# Patient Record
Sex: FEMALE | Race: WHITE | Employment: OTHER | URBAN - METROPOLITAN AREA
[De-identification: names, ages, dates, MRNs, and addresses within clinical notes are randomized per-mention and may not be internally consistent; named-entity substitution may affect disease eponyms.]

---

## 2021-01-15 ENCOUNTER — HOSPITAL ENCOUNTER (INPATIENT)
Age: 86
LOS: 5 days | Discharge: NURSING, REHAB OR LONG-TERM CARE | DRG: 552 | End: 2021-01-21
Attending: EMERGENCY MEDICINE | Admitting: FAMILY MEDICINE
Payer: MEDICARE

## 2021-01-15 ENCOUNTER — APPOINTMENT (OUTPATIENT)
Dept: CT IMAGING | Age: 86
DRG: 552 | End: 2021-01-15
Attending: EMERGENCY MEDICINE
Payer: MEDICARE

## 2021-01-15 ENCOUNTER — APPOINTMENT (OUTPATIENT)
Dept: GENERAL RADIOLOGY | Age: 86
DRG: 552 | End: 2021-01-15
Attending: EMERGENCY MEDICINE
Payer: MEDICARE

## 2021-01-15 DIAGNOSIS — S12.100A CLOSED ODONTOID FRACTURE, INITIAL ENCOUNTER (HCC): Primary | ICD-10-CM

## 2021-01-15 DIAGNOSIS — S01.511A LIP LACERATION, INITIAL ENCOUNTER: ICD-10-CM

## 2021-01-15 PROCEDURE — 73502 X-RAY EXAM HIP UNI 2-3 VIEWS: CPT

## 2021-01-15 PROCEDURE — 72125 CT NECK SPINE W/O DYE: CPT

## 2021-01-15 PROCEDURE — 74011000250 HC RX REV CODE- 250: Performed by: EMERGENCY MEDICINE

## 2021-01-15 PROCEDURE — 70450 CT HEAD/BRAIN W/O DYE: CPT

## 2021-01-15 PROCEDURE — 96372 THER/PROPH/DIAG INJ SC/IM: CPT

## 2021-01-15 PROCEDURE — 99284 EMERGENCY DEPT VISIT MOD MDM: CPT

## 2021-01-15 RX ORDER — BUPIVACAINE HYDROCHLORIDE 5 MG/ML
3 INJECTION, SOLUTION EPIDURAL; INTRACAUDAL ONCE
Status: COMPLETED | OUTPATIENT
Start: 2021-01-15 | End: 2021-01-15

## 2021-01-15 RX ORDER — LANOLIN ALCOHOL/MO/W.PET/CERES
1000 CREAM (GRAM) TOPICAL DAILY
COMMUNITY

## 2021-01-15 RX ORDER — ALLOPURINOL 100 MG/1
100 TABLET ORAL DAILY
COMMUNITY

## 2021-01-15 RX ORDER — ESCITALOPRAM OXALATE 10 MG/1
10 TABLET ORAL DAILY
COMMUNITY

## 2021-01-15 RX ORDER — ACETAMINOPHEN 500 MG
2000 TABLET ORAL DAILY
COMMUNITY

## 2021-01-15 RX ORDER — ATORVASTATIN CALCIUM 20 MG/1
20 TABLET, FILM COATED ORAL DAILY
COMMUNITY

## 2021-01-15 RX ORDER — BUPIVACAINE HYDROCHLORIDE 5 MG/ML
INJECTION, SOLUTION EPIDURAL; INTRACAUDAL
Status: DISPENSED
Start: 2021-01-15 | End: 2021-01-16

## 2021-01-15 RX ORDER — LISINOPRIL 20 MG/1
20 TABLET ORAL DAILY
COMMUNITY

## 2021-01-15 RX ORDER — BUPROPION HYDROCHLORIDE 150 MG/1
150 TABLET ORAL
COMMUNITY

## 2021-01-15 RX ORDER — ASPIRIN 81 MG/1
81 TABLET ORAL DAILY
COMMUNITY

## 2021-01-15 RX ADMIN — BUPIVACAINE HYDROCHLORIDE 15 MG: 5 INJECTION, SOLUTION EPIDURAL; INTRACAUDAL; PERINEURAL at 22:32

## 2021-01-15 NOTE — Clinical Note
Status[de-identified] INPATIENT [101]   Type of Bed: Intensive Care [6]   Inpatient Hospitalization Certified Necessary for the Following Reasons: 9.  Other (further clarification in H&P documentation)   Admitting Diagnosis: Odontoid fracture with type II morphology and anterior displacement [facility]) [[id number]]   Admitting Diagnosis: Falls [[id number]]   Admitting Physician: [address] [place] [address], [address] [facility]   Attending Physician: Jerrod Villeda   Estimated Length of Stay: 2 Midnights   Discharge Plan[de-identified] Home with Office Follow-up

## 2021-01-16 ENCOUNTER — APPOINTMENT (OUTPATIENT)
Dept: MRI IMAGING | Age: 86
DRG: 552 | End: 2021-01-16
Attending: PHYSICIAN ASSISTANT
Payer: MEDICARE

## 2021-01-16 PROBLEM — S12.110A CLOSED ANTERIOR DISPLACED TYPE II DENS FRACTURE (HCC): Status: ACTIVE | Noted: 2021-01-16

## 2021-01-16 PROBLEM — F41.9 ANXIETY AND DEPRESSION: Status: ACTIVE | Noted: 2021-01-16

## 2021-01-16 PROBLEM — F32.A ANXIETY AND DEPRESSION: Status: ACTIVE | Noted: 2021-01-16

## 2021-01-16 PROBLEM — E78.5 HYPERLIPIDEMIA: Status: ACTIVE | Noted: 2021-01-16
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
## 2021-01-16 PROBLEM — S12.110A: Status: ACTIVE | Noted: 2021-01-16

## 2021-01-16 PROBLEM — I10 HTN (HYPERTENSION): Status: ACTIVE | Noted: 2021-01-16

## 2021-01-16 PROBLEM — F03.90 DEMENTIA (HCC): Status: ACTIVE | Noted: 2021-01-16

## 2021-01-16 LAB
ALBUMIN SERPL-MCNC: 3.5 G/DL (ref 3.5–5)
ALBUMIN/GLOB SERPL: 1.1 {RATIO} (ref 1.1–2.2)
ALP SERPL-CCNC: 76 U/L (ref 45–117)
ALT SERPL-CCNC: 38 U/L (ref 12–78)
ANION GAP SERPL CALC-SCNC: 5 MMOL/L (ref 5–15)
APPEARANCE UR: CLEAR
APTT PPP: 24.5 SEC (ref 22.1–31)
AST SERPL-CCNC: 22 U/L (ref 15–37)
BACTERIA URNS QL MICRO: NEGATIVE /HPF
BASOPHILS # BLD: 0 K/UL (ref 0–0.1)
BASOPHILS NFR BLD: 0 % (ref 0–1)
BILIRUB SERPL-MCNC: 0.5 MG/DL (ref 0.2–1)
BILIRUB UR QL: NEGATIVE
BUN SERPL-MCNC: 22 MG/DL (ref 6–20)
BUN/CREAT SERPL: 28 (ref 12–20)
CALCIUM SERPL-MCNC: 8.9 MG/DL (ref 8.5–10.1)
CHLORIDE SERPL-SCNC: 101 MMOL/L (ref 97–108)
CO2 SERPL-SCNC: 30 MMOL/L (ref 21–32)
COLOR UR: NORMAL
CREAT SERPL-MCNC: 0.8 MG/DL (ref 0.55–1.02)
DIFFERENTIAL METHOD BLD: ABNORMAL
EOSINOPHIL # BLD: 0.4 K/UL (ref 0–0.4)
EOSINOPHIL NFR BLD: 3 % (ref 0–7)
EPITH CASTS URNS QL MICRO: NORMAL /LPF
ERYTHROCYTE [DISTWIDTH] IN BLOOD BY AUTOMATED COUNT: 14.9 % (ref 11.5–14.5)
GLOBULIN SER CALC-MCNC: 3.1 G/DL (ref 2–4)
GLUCOSE SERPL-MCNC: 109 MG/DL (ref 65–100)
GLUCOSE UR STRIP.AUTO-MCNC: NEGATIVE MG/DL
HCT VFR BLD AUTO: 44.4 % (ref 35–47)
HGB BLD-MCNC: 13.6 G/DL (ref 11.5–16)
HGB UR QL STRIP: NEGATIVE
IMM GRANULOCYTES # BLD AUTO: 0.1 K/UL (ref 0–0.04)
IMM GRANULOCYTES NFR BLD AUTO: 1 % (ref 0–0.5)
INR PPP: 1.2 (ref 0.9–1.1)
KETONES UR QL STRIP.AUTO: NEGATIVE MG/DL
LEUKOCYTE ESTERASE UR QL STRIP.AUTO: NEGATIVE
LYMPHOCYTES # BLD: 1.8 K/UL (ref 0.8–3.5)
LYMPHOCYTES NFR BLD: 15 % (ref 12–49)
MCH RBC QN AUTO: 30.5 PG (ref 26–34)
MCHC RBC AUTO-ENTMCNC: 30.6 G/DL (ref 30–36.5)
MCV RBC AUTO: 99.6 FL (ref 80–99)
MONOCYTES # BLD: 1.3 K/UL (ref 0–1)
MONOCYTES NFR BLD: 11 % (ref 5–13)
NEUTS SEG # BLD: 8.3 K/UL (ref 1.8–8)
NEUTS SEG NFR BLD: 70 % (ref 32–75)
NITRITE UR QL STRIP.AUTO: NEGATIVE
NRBC # BLD: 0 K/UL (ref 0–0.01)
NRBC BLD-RTO: 0 PER 100 WBC
PH UR STRIP: 6 [PH] (ref 5–8)
PLATELET # BLD AUTO: 198 K/UL (ref 150–400)
PMV BLD AUTO: 11.1 FL (ref 8.9–12.9)
POTASSIUM SERPL-SCNC: 4.4 MMOL/L (ref 3.5–5.1)
PROT SERPL-MCNC: 6.6 G/DL (ref 6.4–8.2)
PROT UR STRIP-MCNC: NEGATIVE MG/DL
PROTHROMBIN TIME: 12.2 SEC (ref 9–11.1)
RBC # BLD AUTO: 4.46 M/UL (ref 3.8–5.2)
RBC #/AREA URNS HPF: NORMAL /HPF (ref 0–5)
SODIUM SERPL-SCNC: 136 MMOL/L (ref 136–145)
SP GR UR REFRACTOMETRY: 1.02 (ref 1–1.03)
THERAPEUTIC RANGE,PTTT: NORMAL SECS (ref 58–77)
UROBILINOGEN UR QL STRIP.AUTO: 0.2 EU/DL (ref 0.2–1)
WBC # BLD AUTO: 11.8 K/UL (ref 3.6–11)
WBC URNS QL MICRO: NORMAL /HPF (ref 0–4)

## 2021-01-16 PROCEDURE — 81001 URINALYSIS AUTO W/SCOPE: CPT

## 2021-01-16 PROCEDURE — 74011250637 HC RX REV CODE- 250/637: Performed by: FAMILY MEDICINE

## 2021-01-16 PROCEDURE — 36415 COLL VENOUS BLD VENIPUNCTURE: CPT

## 2021-01-16 PROCEDURE — 80053 COMPREHEN METABOLIC PANEL: CPT

## 2021-01-16 PROCEDURE — 85610 PROTHROMBIN TIME: CPT

## 2021-01-16 PROCEDURE — 85025 COMPLETE CBC W/AUTO DIFF WBC: CPT

## 2021-01-16 PROCEDURE — 85730 THROMBOPLASTIN TIME PARTIAL: CPT

## 2021-01-16 PROCEDURE — 65270000029 HC RM PRIVATE

## 2021-01-16 PROCEDURE — 72141 MRI NECK SPINE W/O DYE: CPT

## 2021-01-16 RX ORDER — SODIUM CHLORIDE 0.9 % (FLUSH) 0.9 %
5-40 SYRINGE (ML) INJECTION EVERY 8 HOURS
Status: DISCONTINUED | OUTPATIENT
Start: 2021-01-16 | End: 2021-01-21 | Stop reason: HOSPADM

## 2021-01-16 RX ORDER — LANOLIN ALCOHOL/MO/W.PET/CERES
0.4 CREAM (GRAM) TOPICAL DAILY
Status: DISCONTINUED | OUTPATIENT
Start: 2021-01-16 | End: 2021-01-21 | Stop reason: HOSPADM

## 2021-01-16 RX ORDER — POLYETHYLENE GLYCOL 3350 17 G/17G
17 POWDER, FOR SOLUTION ORAL DAILY PRN
Status: DISCONTINUED | OUTPATIENT
Start: 2021-01-16 | End: 2021-01-20 | Stop reason: SDUPTHER

## 2021-01-16 RX ORDER — BUPROPION HYDROCHLORIDE 150 MG/1
150 TABLET ORAL
Status: DISCONTINUED | OUTPATIENT
Start: 2021-01-16 | End: 2021-01-21 | Stop reason: HOSPADM

## 2021-01-16 RX ORDER — ALLOPURINOL 100 MG/1
100 TABLET ORAL DAILY
Status: DISCONTINUED | OUTPATIENT
Start: 2021-01-16 | End: 2021-01-21 | Stop reason: HOSPADM

## 2021-01-16 RX ORDER — LISINOPRIL 20 MG/1
20 TABLET ORAL DAILY
Status: DISCONTINUED | OUTPATIENT
Start: 2021-01-16 | End: 2021-01-21 | Stop reason: HOSPADM

## 2021-01-16 RX ORDER — ACETAMINOPHEN 325 MG/1
650 TABLET ORAL
Status: DISCONTINUED | OUTPATIENT
Start: 2021-01-16 | End: 2021-01-20 | Stop reason: SDUPTHER

## 2021-01-16 RX ORDER — MELATONIN
1000 DAILY
Status: DISCONTINUED | OUTPATIENT
Start: 2021-01-16 | End: 2021-01-21 | Stop reason: HOSPADM

## 2021-01-16 RX ORDER — ACETAMINOPHEN 650 MG/1
650 SUPPOSITORY RECTAL
Status: DISCONTINUED | OUTPATIENT
Start: 2021-01-16 | End: 2021-01-21 | Stop reason: HOSPADM

## 2021-01-16 RX ORDER — CHOLECALCIFEROL TAB 125 MCG (5000 UNIT) 125 MCG
5000 TAB ORAL DAILY
Status: DISCONTINUED | OUTPATIENT
Start: 2021-01-16 | End: 2021-01-21 | Stop reason: HOSPADM

## 2021-01-16 RX ORDER — ACETAMINOPHEN 325 MG/1
650 TABLET ORAL
Status: DISCONTINUED | OUTPATIENT
Start: 2021-01-16 | End: 2021-01-21 | Stop reason: HOSPADM

## 2021-01-16 RX ORDER — POLYETHYLENE GLYCOL 3350 17 G/17G
17 POWDER, FOR SOLUTION ORAL DAILY PRN
Status: DISCONTINUED | OUTPATIENT
Start: 2021-01-16 | End: 2021-01-21 | Stop reason: HOSPADM

## 2021-01-16 RX ORDER — SODIUM CHLORIDE 0.9 % (FLUSH) 0.9 %
5-40 SYRINGE (ML) INJECTION AS NEEDED
Status: DISCONTINUED | OUTPATIENT
Start: 2021-01-16 | End: 2021-01-21 | Stop reason: HOSPADM

## 2021-01-16 RX ORDER — ACETAMINOPHEN 650 MG/1
650 SUPPOSITORY RECTAL
Status: DISCONTINUED | OUTPATIENT
Start: 2021-01-16 | End: 2021-01-20 | Stop reason: SDUPTHER

## 2021-01-16 RX ORDER — ATORVASTATIN CALCIUM 20 MG/1
20 TABLET, FILM COATED ORAL DAILY
Status: DISCONTINUED | OUTPATIENT
Start: 2021-01-16 | End: 2021-01-21 | Stop reason: HOSPADM

## 2021-01-16 RX ORDER — ESCITALOPRAM OXALATE 10 MG/1
10 TABLET ORAL DAILY
Status: DISCONTINUED | OUTPATIENT
Start: 2021-01-16 | End: 2021-01-21 | Stop reason: HOSPADM

## 2021-01-16 RX ADMIN — Medication 10 ML: at 09:15

## 2021-01-16 RX ADMIN — ATORVASTATIN CALCIUM 20 MG: 20 TABLET, FILM COATED ORAL at 10:20

## 2021-01-16 RX ADMIN — ALLOPURINOL 100 MG: 100 TABLET ORAL at 10:20

## 2021-01-16 RX ADMIN — Medication 10 ML: at 14:00

## 2021-01-16 RX ADMIN — CHOLECALCIFEROL TAB 125 MCG (5000 UNIT) 5000 UNITS: 125 TAB at 10:20

## 2021-01-16 RX ADMIN — Medication 10 ML: at 21:15

## 2021-01-16 RX ADMIN — Medication 1 CAPSULE: at 10:20

## 2021-01-16 RX ADMIN — ACETAMINOPHEN 650 MG: 325 TABLET ORAL at 09:15

## 2021-01-16 RX ADMIN — LISINOPRIL 20 MG: 20 TABLET ORAL at 10:00

## 2021-01-16 RX ADMIN — BUPROPION HYDROCHLORIDE 150 MG: 150 TABLET, EXTENDED RELEASE ORAL at 10:20

## 2021-01-16 RX ADMIN — ACETAMINOPHEN 650 MG: 325 TABLET ORAL at 21:28

## 2021-01-16 RX ADMIN — Medication 0.4 MG: at 10:20

## 2021-01-16 RX ADMIN — ESCITALOPRAM OXALATE 10 MG: 10 TABLET ORAL at 10:20

## 2021-01-16 RX ADMIN — Medication 1 TABLET: at 10:20

## 2021-01-16 RX ADMIN — Medication 10 ML: at 10:21

## 2021-01-16 NOTE — PROGRESS NOTES
TRANSFER - IN REPORT:    Verbal report received from AndrewRN(name) on Tha Jackson  being received from [facility] ED(unit) for routine progression of care      Report consisted of patients Situation, Background, Assessment and   Recommendations(SBAR). Information from the following report(s) SBAR, Kardex, ED Summary, Procedure Summary, MAR, Accordion, Recent Results and Med Rec Status was reviewed with the receiving nurse. Opportunity for questions and clarification was provided. Assessment completed upon patients arrival to unit and care assumed.

## 2021-01-16 NOTE — PROGRESS NOTES
CMS Note  1/16/2021    Patient received their 1st IMM letter, patient was provided a copy for their record.   Kathrine Espinal CMS

## 2021-01-16 NOTE — PROGRESS NOTES
Advance Care Planning (ACP) Provider Note - Comprehensive      Date of ACP Conversation:  01/16/21    Persons included in Conversation:  patient   Length of ACP Conversation in minutes:  16 minutes     Authorized Decision Maker (if patient is incapable of making informed decisions): This person is: Mrs Divina Harris for ALL Patients with Decision Making Capacity:  Importance of advance care planning, including choosing a healthcare agent to communicate patient's healthcare decisions if patient lost the ability to make decisions. Review of Existing Advance Directive:  Patient and family do not have an advance directive readily available for review. no     Active Diagnoses:   odontoid fracture     These active diagnoses are of sufficient risk that focused discussion on advance care planning is indicated in order to allow the patient to thoughtfully consider personal goals of care; and, if situations arise that prevent the ability to personally give input, to ensure appropriate representation of their personal desires for different levels and aggressiveness of care. For Serious or Chronic Illness:  Understanding of medical condition     Reviewed pt's clinical status. Tarun Rizvi has multiple medical problems including HTN, anxiety, depression, dementia and is being admitted for odontoid fracture. We reviewed our treatment plan and anticipated discharge plans. Further, we discussed pt's wishes on how  he would like to proceed (aggressive/heroic resuscitation vs not intervening and allowing nature takes its course) if  he were to suffer cardiopulmonary arrest.    Understanding of CPR, goals and expected outcomes, benefits and burdens discussed. Information on CPR success provided (many factors lower a patients chance of survival, including advanced age, performance status, malignancy, and presence of multiple comorbidities);  Individual asked to communicate understanding of information in his/her own words.     CPR works best to restart the heart when there is a sudden event, like a heart attack, in someone who is otherwise healthy. Unfortunately, CPR does not typically restart the heart for people who have serious health conditions or who are very sick. \"     \"In the event your heart stopped as a result of an underlying serious health condition, would you want attempts to be made to restart your heart (answer \"yes\" for attempt to resuscitate) or would you prefer a natural death (answer \"no\" for do not attempt to resuscitate)? \" yes    Patient made it very clear to me that she would like to be resuscitated in the event of cardiopulmonary arrest, including chest compressions, defibrillation, intubation/mechanical ventilation.  *  Interventions Provided:  Referral made for ACP follow-up assistance to: Palliative care

## 2021-01-16 NOTE — ED NOTES
[facility] at bedside. Pt report, facesheet, emtala, and summary given to [facility]. Pt travels in a c-collar and with a 22g in rt forearm. Discharge or Transfer Assessment: Patient A&O x 3 and in no distress. Physical re-examination reveals improvement in pt's condition with reassessment of vital signs completed at the time of admission transfer and/or discharge.

## 2021-01-16 NOTE — CONSULTS
ORTHOPEDIC SURGERY CONSULT    Subjective:     Date of Consultation:  January 16, 2021    Referring Physician:  Dr. Dexter Rueda is a 80 y.o. female who is being seen for a type 2 odontoid fracture. She recently moved from [place] to live with her daughter. She has a past medical history of HTN, chronic type 2 odontoid fracture, non-hodgkins lymphoma followed at [facility], dementia, DM-2 diet controlled, anxiety/depression, and DVT on xarelto. She presented to the [facility] last night after suffering 3 falls over the past 1 month. She was found to have a minimally displaced Type 2 odontoid fracture and was admitted for debility. I called and spoke with the daughter this morning who is primary care taker for her. She moved her down here due to falls that occurred over the summer. She had one in July and one in September. She was seen at [facility] by oncology and had CT soft tissues of the neck done. She read me the report which showed the odontoid fracture then. She is currently in a hard collar without pain. She denies numbness, parasthesias, or bowel/bladder incontinence. Patient Active Problem List    Diagnosis Date Noted    Falls 01/16/2021    Odontoid fracture with type II morphology and anterior displacement ([facility] [address] 01/16/2021    Closed anterior displaced type II dens fracture ([facility] [address] 01/16/2021    HTN (hypertension) 01/16/2021    Hyperlipidemia 01/16/2021    Anxiety and depression 01/16/2021    Dementia ([facility] [address] 01/16/2021     No family history on file. Social History     Tobacco Use    Smoking status: Not on file   Substance Use Topics    Alcohol use: Not on file     No past medical history on file. No past surgical history on file. Prior to Admission medications    Medication Sig Start Date End Date Taking? Authorizing Provider   allopurinoL (ZYLOPRIM) 100 mg tablet Take 100 mg by mouth daily.    Yes Other, MD Nanda   lisinopriL (PRINIVIL, ZESTRIL) 20 mg tablet Take 20 mg by mouth daily. Yes Other, MD Nanda   cholecalciferol (VITAMIN D3) (2,000 UNITS /50 MCG) cap capsule Take 2,000 Units by mouth daily. Take 3 [id number]   Yes Ozzie, MD Nanda   FOLIC ACID PO Take 977 mcg by mouth daily. Yes Other, MD Nanda   cyanocobalamin (Vitamin B-12) 1,000 mcg tablet Take 1,000 mcg by mouth daily. Yes Ozzie, MD Nanda   rivaroxaban (Xarelto) 15 mg tab tablet Take 15 mg by mouth daily. Yes Ozzie, MD Nanda   buPROPion XL (WELLBUTRIN XL) 150 mg tablet Take 150 mg by mouth every morning. Yes Other, MD Nanda   cranberry fruit extract (CRANBERRY PO) Take 4,200 mg by mouth daily. Takes 2 [id number]   Yes Ozzie, MD Nanda   ALLOPURINOL PO Take 10 mg by mouth daily. Yes Other, MD Nanda   escitalopram oxalate (LEXAPRO) 10 mg tablet Take 10 mg by mouth daily. Yes Ozzie, MD Nanda   atorvastatin (LIPITOR) 20 mg tablet Take 20 mg by mouth daily. Yes Ozzie, MD Nanda   aspirin delayed-release 81 mg tablet Take 81 mg by mouth daily. Yes Other, MD Nanda   Lactobac no.41/Bifidobact no.7 (PROBIOTIC-10 PO) Take 1 Tab by mouth daily. Yes Ozzie, MD Nanda   cranberry fruit extract (CRANBERRY EXTRACT PO) Take 4,200 mg by mouth daily.  Takes 2 pills   Yes Ozzie, MD Nanda     Current Facility-Administered Medications   Medication Dose Route Frequency    allopurinoL (ZYLOPRIM) tablet 100 mg  100 mg Oral DAILY    atorvastatin (LIPITOR) tablet 20 mg  20 mg Oral DAILY    buPROPion XL (WELLBUTRIN XL) tablet 150 mg  150 mg Oral 7am    escitalopram oxalate (LEXAPRO) tablet 10 mg  10 mg Oral DAILY    folic acid tablet 0.4 mg  0.4 mg Oral DAILY    lactobac ac& pc-s.therm-b.anim (KELLI Q/RISAQUAD)  1 Cap Oral DAILY    lisinopriL (PRINIVIL, ZESTRIL) tablet 20 mg  20 mg Oral DAILY    sodium chloride (NS) flush 5-40 mL  5-40 mL IntraVENous Q8H    sodium chloride (NS) flush 5-40 mL  5-40 mL IntraVENous PRN    acetaminophen (TYLENOL) tablet 650 mg  650 mg Oral Q6H PRN    Or    acetaminophen (TYLENOL) suppository 650 mg 650 mg Rectal Q6H PRN    polyethylene glycol (MIRALAX) packet 17 g  17 g Oral DAILY PRN    sodium chloride (NS) flush 5-40 mL  5-40 mL IntraVENous Q8H    sodium chloride (NS) flush 5-40 mL  5-40 mL IntraVENous PRN    acetaminophen (TYLENOL) tablet 650 mg  650 mg Oral Q6H PRN    Or    acetaminophen (TYLENOL) suppository 650 mg  650 mg Rectal Q6H PRN    polyethylene glycol (MIRALAX) packet 17 g  17 g Oral DAILY PRN    cholecalciferol (VITAMIN D3) tablet 5,000 Units  5,000 Units Oral DAILY    And    cholecalciferol (VITAMIN D3) (1000 Units /25 mcg) tablet 1 Tab  1,000 Units Oral DAILY     No Known Allergies     Review of Systems:  A comprehensive review of systems was negative. Objective:     Patient Vitals for the past 8 hrs:   BP Temp Pulse Resp SpO2   21 0734 112/72 98 °F (36.7 °C) 80 16 91 %   21 0650 126/66 98.1 °F (36.7 °C) 86 16 92 %   21 0600 131/75  83 16 95 %   21 0530 131/75    93 %   21 0525 (!) 149/61 99.8 °F (37.7 °C) 86 16 95 %   21 0430 (!) 121/55    93 %   21 0330 (!) 123/51    94 %     Temp (24hrs), Av.4 °F (36.9 °C), Min:97.8 °F (36.6 °C), Max:99.8 °F (37.7 °C)        EXAM: GEN: Well appearing female in NAD  PSYCH:  AAO x 2  MUSC/NEURO: Hard collar in place. There are multiple abrasions and degrees of ecchymosis on her face. Able to move all extremities. She has no pain with left or right shoulder ROM. Left and right bicep is 5/5. Left and right tricep is 5/5. There is mild pain to palpation of the cervical spinous processes. There is no palpable stepoff. Negative Elias's bilaterally. BL LE: 5/5 throughout. No ankle clonus. 3+ bicep reflex bilaterally. +radial and ulnar pulses BL    IMAGING:  The bones are severely osteopenic. There is severe multilevel degenerative  spondylosis. There is a fracture at the base of the odontoid, age indeterminate,  with some of the margins appearing sclerotic.  However, if the fracture is  chronic, then there is nonunion. There is apex anterior angulation and minimal  posterior displacement of the odontoid relative to the body of C2. Vertebral  body heights are maintained. There is no appreciable prevertebral soft tissue  swelling or epidural hematoma. There is multilevel bilateral neuroforaminal  stenosis as well as central canal stenosis. Evaluation of the paraspinal soft  tissues demonstrates bilateral carotid artery calcific atherosclerosis. The  visualized lung apices are clear.     IMPRESSION  IMPRESSION:     1. Type II odontoid fracture of C2 with apex anterior angulation and minimal  displacement. This fracture is age indeterminate, and there are no prior studies  for comparison. 2. Severe multilevel degenerative spondylosis. Data Review   Recent Results (from the past 24 hour(s))   METABOLIC PANEL, COMPREHENSIVE    Collection Time: 01/16/21  1:58 AM   Result Value Ref Range    Sodium 136 136 - 145 mmol/L    Potassium 4.4 3.5 - 5.1 mmol/L    Chloride 101 97 - 108 mmol/L    CO2 30 21 - 32 mmol/L    Anion gap 5 5 - 15 mmol/L    Glucose 109 (H) 65 - 100 mg/dL    BUN 22 (H) 6 - 20 MG/DL    Creatinine 0.80 0.55 - 1.02 MG/DL    BUN/Creatinine ratio 28 (H) 12 - 20      GFR est AA >60 >60 ml/min/1.73m2    GFR est non-AA >60 >60 ml/min/1.73m2    Calcium 8.9 8.5 - 10.1 MG/DL    Bilirubin, total 0.5 0.2 - 1.0 MG/DL    ALT (SGPT) 38 12 - 78 U/L    AST (SGOT) 22 15 - 37 U/L    Alk.  phosphatase 76 45 - 117 U/L    Protein, total 6.6 6.4 - 8.2 g/dL    Albumin 3.5 3.5 - 5.0 g/dL    Globulin 3.1 2.0 - 4.0 g/dL    A-G Ratio 1.1 1.1 - 2.2     CBC WITH AUTOMATED DIFF    Collection Time: 01/16/21  1:58 AM   Result Value Ref Range    WBC 11.8 (H) 3.6 - 11.0 K/uL    RBC 4.46 3.80 - 5.20 M/uL    HGB 13.6 11.5 - 16.0 g/dL    HCT 44.4 35.0 - 47.0 %    MCV 99.6 (H) 80.0 - 99.0 FL    MCH 30.5 26.0 - 34.0 PG    MCHC 30.6 30.0 - 36.5 g/dL    RDW 14.9 (H) 11.5 - 14.5 %    PLATELET 804 217 - 394 K/uL    MPV 11.1 8.9 - 12.9 FL    NRBC 0.0 0.0  WBC    ABSOLUTE NRBC 0.00 0.00 - 0.01 K/uL    NEUTROPHILS 70 32 - 75 %    LYMPHOCYTES 15 12 - 49 %    MONOCYTES 11 5 - 13 %    EOSINOPHILS 3 0 - 7 %    BASOPHILS 0 0 - 1 %    IMMATURE GRANULOCYTES 1 (H) 0 - 0.5 %    ABS. NEUTROPHILS 8.3 (H) 1.8 - 8.0 K/UL    ABS. LYMPHOCYTES 1.8 0.8 - 3.5 K/UL    ABS. MONOCYTES 1.3 (H) 0.0 - 1.0 K/UL    ABS. EOSINOPHILS 0.4 0.0 - 0.4 K/UL    ABS. BASOPHILS 0.0 0.0 - 0.1 K/UL    ABS. IMM. GRANS. 0.1 (H) 0.00 - 0.04 K/UL    DF AUTOMATED     PROTHROMBIN TIME + INR    Collection Time: 01/16/21  1:58 AM   Result Value Ref Range    INR 1.2 (H) 0.9 - 1.1      Prothrombin time 12.2 (H) 9.0 - 11.1 sec   PTT    Collection Time: 01/16/21  1:58 AM   Result Value Ref Range    aPTT 24.5 22.1 - 31.0 sec    aPTT, therapeutic range     58.0 - 77.0 SECS   URINALYSIS W/MICROSCOPIC    Collection Time: 01/16/21  2:07 AM   Result Value Ref Range    Color YELLOW/STRAW      Appearance CLEAR CLEAR      Specific gravity 1.020 1.003 - 1.030      pH (UA) 6.0 5.0 - 8.0      Protein Negative NEG mg/dL    Glucose Negative NEG mg/dL    Ketone Negative NEG mg/dL    Bilirubin Negative NEG      Blood Negative NEG      Urobilinogen 0.2 0.2 - 1.0 EU/dL    Nitrites Negative NEG      Leukocyte Esterase Negative NEG      WBC 5-10 0 - 4 /hpf    RBC 0-5 0 - 5 /hpf    Epithelial cells FEW FEW /lpf    Bacteria Negative NEG /hpf         Assessment/Plan:   A: 1. Chronic type 2A odontoid fracture  2. Dementia  3. Debility    P: 1. Need to obtain MRI of the cervical spine today to evaluate acuity of the injury. There is no hematoma on the CT so I believe this is a chronic fracture. If there is no evidence of acute trauma we can clear her C spine while inpatient. Daughter was concerned about her hips. We will continue to monitor. If she has worsening pain I would obtain a MRI to evaluate for a femoral neck fracture. Orthopedics to followup MRI.      Discussed case with Dr. Shayla Molina who agrees with plan.         Lev Hogan, [address] [facility]   Orthopaedic Surgery [facility]  [address] [facility]

## 2021-01-16 NOTE — ED NOTES
Imaging called and requested to have c-collar placed for patient. Dr. Tristen Abrams made aware and is agreeable with plan. RN palced c-collar and patient tolerated well.

## 2021-01-16 NOTE — PROGRESS NOTES
Bedside and Verbal shift change report given to [address] [place] [address] (oncoming nurse) by Wilbert Messina (offgoing nurse). Report included the following information SBAR, Kardex, Procedure Summary, Intake/Output, MAR and Accordion.

## 2021-01-16 NOTE — ROUTINE PROCESS
TRANSFER - OUT REPORT: 
 
Verbal report given to Cheri So rn on Theresa Cortez  being transferred to [facility], rm [phone] for routine progression of care Report consisted of patients Situation, Background, Assessment and  
Recommendations(SBAR). Information from the following report(s) SBAR, Kardex, ED Summary, [facility] and Recent Results was reviewed with the receiving nurse. Lines:  
Peripheral IV 01/16/21 Right; Lower Arm (Active) Site Assessment Clean, dry, & intact 01/16/21 0201 Phlebitis Assessment 0 01/16/21 0201 Infiltration Assessment 0 01/16/21 0201 Dressing Status Clean, dry, & intact 01/16/21 0201 Dressing Type Tape;Transparent 01/16/21 0201 Hub Color/Line Status Blue;Capped;Flushed;Patent 01/16/21 0201 Action Taken Blood drawn 01/16/21 0201 Opportunity for questions and clarification was provided. Patient transported with: 
 20g in rt forearm and c-collar

## 2021-01-16 NOTE — ED PROVIDER NOTES
59-year-old female past medical history markable for being on Xarelto presents the ER with her daughter complaining of \"she fell this evening getting up from the dinner table. \"  Patient's daughter reports that typically when she falls she fell straight backwards. She had a fall Tuesday evening was not evaluated anywhere. This evening's episode was unwitnessed though they think she fell more to the side due to the right sided lip laceration and right hip pain. She was not able to get up on her own they deny loss of consciousness as \"I went running into the room right away. Patient denies current systemic complaints says maybe she has a little bit of soreness in the right posterior buttock. She has been ambulatory since the fall, and per the daughter Griffin Brooks already eaten dinner she was going back to the table to get dessert when she fell. We then play board games for 2 hours. She gotten up okay and did not seem to have any pain. Then got up to go to bed and she said she was hurting so we decided to bring her here for further evaluation. Not had anything for her symptoms. She has unknown last tetanus;     pt denies HA, vison changes, diff swallowing, CP, SOB, Abd pain, F/Ch, N/V, D/Cons or other current systemic complaints    Social/ PSH reviewed in EMR    EMR Chart Reviewed           No past medical history on file. No past surgical history on file. No family history on file.     Social History     Socioeconomic History    Marital status:      Spouse name: Not on file    Number of children: Not on file    Years of education: Not on file    Highest education level: Not on file   Occupational History    Not on file   Social Needs    Financial resource strain: Not on file    Food insecurity     Worry: Not on file     Inability: Not on file    Transportation needs     Medical: Not on file     Non-medical: Not on file   Tobacco Use    Smoking status: Not on file   Substance and Sexual Activity    Alcohol use: Not on file    Drug use: Not on file    Sexual activity: Not on file   Lifestyle    Physical activity     Days per week: Not on file     Minutes per session: Not on file    Stress: Not on file   Relationships    Social connections     Talks on phone: Not on file     Gets together: Not on file     Attends Restoration service: Not on file     Active member of club or organization: Not on file     Attends meetings of clubs or organizations: Not on file     Relationship status: Not on file    Intimate partner violence     Fear of current or ex partner: Not on file     Emotionally abused: Not on file     Physically abused: Not on file     Forced sexual activity: Not on file   Other Topics Concern    Not on file   Social History Narrative    Not on file         ALLERGIES: Patient has no known allergies. Review of Systems   Constitutional: Negative for appetite change, chills and fever. HENT: Negative for drooling, rhinorrhea, sore throat, trouble swallowing and voice change. Eyes: Negative for photophobia and visual disturbance. Respiratory: Negative for cough, choking, chest tightness and shortness of breath. Cardiovascular: Negative for chest pain, palpitations and leg swelling. Gastrointestinal: Negative for abdominal pain, constipation, diarrhea and vomiting. Genitourinary: Negative for dysuria. Musculoskeletal: Negative for back pain and neck pain. Skin: Positive for wound. Neurological: Negative for facial asymmetry and speech difficulty. All other systems reviewed and are negative. Vitals:    01/15/21 2213 01/16/21 0130   BP: (!) 137/53 132/68   Pulse: 77    Resp: 18    Temp: 97.8 °F (36.6 °C)    SpO2: 96% 95%            Physical Exam  Vitals signs and nursing note reviewed. Constitutional:       General: She is not in acute distress. Appearance: Normal appearance. She is well-developed. She is not ill-appearing, toxic-appearing or diaphoretic. Comments: NAD, AxOx4, speaking in complete sentences       HENT:      Head: Normocephalic. Comments: Cn intact    R upper lip skin tear noted; does not cross vermillion border    No loose/ broken teeth, bite alignment wnl; No septal hematoma/ hemotympanum or mastoid tenderness noted     Right Ear: External ear normal.      Left Ear: External ear normal.      Nose: Nose normal.   Eyes:      General: No scleral icterus. Right eye: No discharge. Left eye: No discharge. Extraocular Movements: Extraocular movements intact. Conjunctiva/sclera: Conjunctivae normal.      Pupils: Pupils are equal, round, and reactive to light. Neck:      Musculoskeletal: Normal range of motion and neck supple. No neck rigidity or muscular tenderness. Vascular: No JVD. Trachea: No tracheal deviation. Cardiovascular:      Rate and Rhythm: Normal rate and regular rhythm. Pulses: Normal pulses. Heart sounds: Normal heart sounds. No murmur. No friction rub. No gallop. Pulmonary:      Effort: Pulmonary effort is normal. No respiratory distress. Breath sounds: Normal breath sounds. No wheezing or rales. Chest:      Chest wall: No tenderness. Abdominal:      General: Bowel sounds are normal.      Palpations: Abdomen is soft. Tenderness: There is no abdominal tenderness. There is no guarding or rebound. Genitourinary:     Vagina: No vaginal discharge. Musculoskeletal: Normal range of motion. General: No swelling, tenderness, deformity or signs of injury. Right lower leg: No edema. Left lower leg: No edema. Comments: Pt had central/ paraspinal C/T/L spines, Upper/Lower ext long bones, Abdomen,  Pelvis,bilat s boxes/  hands /feet and all joints palpated and tolerated well (except as above) ; Pt has motor/ CV / Sensation grossly intact to all extremities;   Skin:     General: Skin is warm and dry.       Capillary Refill: Capillary refill takes less than 2 seconds. Coloration: Skin is not jaundiced or pale. Findings: No bruising, erythema, lesion or rash. Neurological:      General: No focal deficit present. Mental Status: She is alert and oriented to person, place, and time. Cranial Nerves: No cranial nerve deficit. Sensory: No sensory deficit. Motor: No weakness or abnormal muscle tone. Coordination: Coordination normal.      Comments: pt has motor/ CV/ Sensation grossly intact to all extremities, R = L in strength;     R = L;    Psychiatric:         Behavior: Behavior normal.         Thought Content: Thought content normal.          MDM       Procedures    12:11 AM  In discussion w/ daughter/ 'maybe she had this before?'; She can find old head CT scans, but no old c spine films; Pt is from [place]';     CONSULT  NOTE  12:12 AM  Jc Garcia MD spoke via PS with Dr Cary Duong. Specialty: Orthopaedics; Discussed pt's hx, disposition, and available diagnostic and imaging results. Reviewed care plans. Consulting physician agrees with plans as outlined 'will review films'; .      1:31 AM  [facility]/ called/ 'please admit to [facility]/ Daughter/ Pt agree; Daughter wishes to not repair lip now;     Perfect Serve Consult for Admission  1:33 AM    ED Room Number: WER06/06  Patient Name and age: Neva Acosta 80 y.o.  female  Working Diagnosis:   1. Closed odontoid fracture, initial encounter ([facility] [address]    2.  Lip laceration, initial encounter        COVID-19 Suspicion:  no  Sepsis present:  no  Reassessment needed: yes  Code Status:  Full Code  Readmission: no  Isolation Requirements:  no  Recommended Level of Care:  med/surg  Department:[facility] - [id number]  Other:  On xarelto/ increased falls/ odontoid type II fracture/ Ortho aware;

## 2021-01-16 NOTE — H&P
[address]  [phone]    Admission History and Physical      NAME:  Dalton Mancia   :   1934   MRN:  [id number]     PCP:  Kev Morgan MD     Date of service:  2021         Subjective:     CHIEF COMPLAINT: Fall, neck pain    HISTORY OF PRESENT ILLNESS:     Ms. Kartik Ellis is a 80 y.o.  female who is admitted with odontoid fracture of C2. Ms. Kartik Ellis with past medical history of hypertension, anxiety and depression, dementia presented to ER complaining of neck pain after a fall. Patient is a poor historian and according to medical records, patient failed getting up from the dinner table yesterday evening. Patient fell backwards. After the fall, patient was complaining of right hip pain and neck pain. No loss of consciousness. No past medical history on file. No past surgical history on file. Social History     Tobacco Use    Smoking status: Not on file   Substance Use Topics    Alcohol use: Not on file        No family history on file. No Known Allergies     Prior to Admission medications    Medication Sig Start Date End Date Taking? Authorizing Provider   allopurinoL (ZYLOPRIM) 100 mg tablet Take 100 mg by mouth daily. Yes Ozzie, MD Nanda   lisinopriL (PRINIVIL, ZESTRIL) 20 mg tablet Take 20 mg by mouth daily. Yes Other, MD Nanda   cholecalciferol (VITAMIN D3) (2,000 UNITS /50 MCG) cap capsule Take 2,000 Units by mouth daily. Take 3 [id number]   Yes Ozzie, MD Nanda   FOLIC ACID PO Take 390 mcg by mouth daily. Yes Ozzie, MD Nanda   cyanocobalamin (Vitamin B-12) 1,000 mcg tablet Take 1,000 mcg by mouth daily. Yes Ozzie, MD Nanda   rivaroxaban (Xarelto) 15 mg tab tablet Take 15 mg by mouth daily. Yes Nanda Horne MD   buPROPion XL (WELLBUTRIN XL) 150 mg tablet Take 150 mg by mouth every morning. Yes Other, MD Nanda   cranberry fruit extract (CRANBERRY PO) Take 4,200 mg by mouth daily.  Takes 2 [id number]   Yes Other, MD Nanda   ALLOPURINOL PO Take 10 mg by mouth daily. Yes Other, MD Nanda   escitalopram oxalate (LEXAPRO) 10 mg tablet Take 10 mg by mouth daily. Yes Ozzie, MD Nanda   atorvastatin (LIPITOR) 20 mg tablet Take 20 mg by mouth daily. Yes Ozzie, MD Nanda   aspirin delayed-release 81 mg tablet Take 81 mg by mouth daily. Yes Other, MD Nanda   Lactobac no.41/Bifidobact no.7 (PROBIOTIC-10 PO) Take 1 Tab by mouth daily. Yes Other, MD Nanda   cranberry fruit extract (CRANBERRY EXTRACT PO) Take 4,200 mg by mouth daily.  Takes 2 pills   Yes Ozzie, MD Nanda         Review of Systems:  (bold if positive, if negative)    Gen:  Eyes:  ENT:  CVS:  Pulm:  GI:  :  MS:  Skin:  Psych:  Endo:  Hem:  Renal:  Neuro:            Objective:      VITALS:    Vital signs reviewed; most recent are:    Visit Vitals  /72 (BP 1 Location: Left arm, BP Patient Position: At rest)   Pulse 80   Temp 98 °F (36.7 °C)   Resp 16   Wt 55.8 kg (123 lb)   SpO2 91%     SpO2 Readings from Last 6 Encounters:   01/16/21 91%        No intake or output data in the 24 hours ending 01/16/21 0736         Exam:     Physical Exam:    Gen:  Well-developed, well-nourished, in no acute distress  HEENT:  Pink conjunctivae, PERRL, hearing intact to voice, moist mucous membranes  Neck:  Supple, without masses, thyroid non-tender  Resp:  No accessory muscle use, clear breath sounds without wheezes rales or rhonchi  Card:  No murmurs, normal S1, S2 without thrills, bruits or peripheral edema  Abd:  Soft, non-tender, non-distended, normoactive bowel sounds are present, no palpable organomegaly and no detectable hernias  Lymph:  No cervical or inguinal adenopathy  Musc:  No cyanosis or clubbing  Skin:  No rashes or ulcers, skin turgor is good  Neuro:  Cranial nerves are grossly intact, no focal motor weakness, follows commands appropriately  Psych: Poor insight,         Labs:    Recent Labs     01/16/21  0158   WBC 11.8*   HGB 13.6   HCT 44.4      Recent Labs     01/16/21  0158      K 4.4      CO2 30   *   BUN 22*   CREA 0.80   CA 8.9   ALB 3.5   TBILI 0.5   ALT 38     No results found for: GLUCPOC  No results for input(s): PH, PCO2, PO2, HCO3, FIO2 in the last 72 hours. Recent Labs     01/16/21  0158   INR 1.2*       Telemetry reviewed:           Assessment/Plan:    1. Odontoid fracture with type II morphology and anterior displacement ([facility] [address] (1/16/2021)/ Falls (1/16/2021). Admit to medical.  Pain management. Fall precaution. Consult orthopedics. 2.  HTN (hypertension) (1/16/2021). Continue lisinopril    3. Hyperlipidemia (1/16/2021). On statin    4. Anxiety and depression (1/16/2021)/ Dementia ([facility] [address] (1/16/2021). Continue Wellbutrin and Lexapro. Need to be evaluated by neuropsychologist as an outpatient to evaluate cognition.     CODE STATUS: Full         Previous medical records reviewed     Risk of deterioration: medium      Total time spent with patient: 53 [address] discussed with: Patient, Nursing Staff and >50% of time spent in counseling and coordination of care    Discussed:  Care Plan    Prophylaxis:  SCD's    Probable Disposition:  Home w/Family           ___________________________________________________    Attending Physician: Tito Payan MD

## 2021-01-16 NOTE — ED TRIAGE NOTES
Patient brought to ED treatment area using personal wheelchair for complaint of \"she fell while getting up from the dinner table around 7:30pm/ She fell backwards and hit her lip causing a cut. We thought she was okay but she is having trouble walking and complains of pain in her right hip area. \" Pt takes xarelto for blood thinner.

## 2021-01-16 NOTE — ED NOTES
Family updated on plans for admission to [facility] and awaiting on bed order.

## 2021-01-16 NOTE — ED NOTES
Pt is resting on stretcher with lights dimmed. Daughter updated on admission details and transport to [facility].

## 2021-01-17 LAB
ANION GAP SERPL CALC-SCNC: 6 MMOL/L (ref 5–15)
BASOPHILS # BLD: 0 K/UL (ref 0–0.1)
BASOPHILS NFR BLD: 0 % (ref 0–1)
BUN SERPL-MCNC: 14 MG/DL (ref 6–20)
BUN/CREAT SERPL: 18 (ref 12–20)
CALCIUM SERPL-MCNC: 9.3 MG/DL (ref 8.5–10.1)
CHLORIDE SERPL-SCNC: 102 MMOL/L (ref 97–108)
CO2 SERPL-SCNC: 27 MMOL/L (ref 21–32)
CREAT SERPL-MCNC: 0.8 MG/DL (ref 0.55–1.02)
DIFFERENTIAL METHOD BLD: ABNORMAL
EOSINOPHIL # BLD: 0.3 K/UL (ref 0–0.4)
EOSINOPHIL NFR BLD: 2 % (ref 0–7)
ERYTHROCYTE [DISTWIDTH] IN BLOOD BY AUTOMATED COUNT: 14.9 % (ref 11.5–14.5)
GLUCOSE SERPL-MCNC: 127 MG/DL (ref 65–100)
HCT VFR BLD AUTO: 44.7 % (ref 35–47)
HGB BLD-MCNC: 14.4 G/DL (ref 11.5–16)
IMM GRANULOCYTES # BLD AUTO: 0.1 K/UL (ref 0–0.04)
IMM GRANULOCYTES NFR BLD AUTO: 0 % (ref 0–0.5)
LYMPHOCYTES # BLD: 1.5 K/UL (ref 0.8–3.5)
LYMPHOCYTES NFR BLD: 12 % (ref 12–49)
MCH RBC QN AUTO: 31.4 PG (ref 26–34)
MCHC RBC AUTO-ENTMCNC: 32.2 G/DL (ref 30–36.5)
MCV RBC AUTO: 97.4 FL (ref 80–99)
MONOCYTES # BLD: 1.8 K/UL (ref 0–1)
MONOCYTES NFR BLD: 14 % (ref 5–13)
NEUTS SEG # BLD: 8.7 K/UL (ref 1.8–8)
NEUTS SEG NFR BLD: 72 % (ref 32–75)
NRBC # BLD: 0 K/UL (ref 0–0.01)
NRBC BLD-RTO: 0 PER 100 WBC
PLATELET # BLD AUTO: 170 K/UL (ref 150–400)
PMV BLD AUTO: 11 FL (ref 8.9–12.9)
POTASSIUM SERPL-SCNC: 4.3 MMOL/L (ref 3.5–5.1)
RBC # BLD AUTO: 4.59 M/UL (ref 3.8–5.2)
SODIUM SERPL-SCNC: 135 MMOL/L (ref 136–145)
WBC # BLD AUTO: 12.3 K/UL (ref 3.6–11)

## 2021-01-17 PROCEDURE — 85025 COMPLETE CBC W/AUTO DIFF WBC: CPT

## 2021-01-17 PROCEDURE — 97161 PT EVAL LOW COMPLEX 20 MIN: CPT

## 2021-01-17 PROCEDURE — 65270000029 HC RM PRIVATE

## 2021-01-17 PROCEDURE — 97116 GAIT TRAINING THERAPY: CPT

## 2021-01-17 PROCEDURE — 80048 BASIC METABOLIC PNL TOTAL CA: CPT

## 2021-01-17 PROCEDURE — 77030038269 HC DRN EXT URIN PURWCK BARD -A

## 2021-01-17 PROCEDURE — 94760 N-INVAS EAR/PLS OXIMETRY 1: CPT

## 2021-01-17 PROCEDURE — 74011250637 HC RX REV CODE- 250/637: Performed by: FAMILY MEDICINE

## 2021-01-17 PROCEDURE — 2709999900 HC NON-CHARGEABLE SUPPLY

## 2021-01-17 PROCEDURE — 36415 COLL VENOUS BLD VENIPUNCTURE: CPT

## 2021-01-17 RX ADMIN — ALLOPURINOL 100 MG: 100 TABLET ORAL at 09:26

## 2021-01-17 RX ADMIN — Medication 0.4 MG: at 09:26

## 2021-01-17 RX ADMIN — ACETAMINOPHEN 650 MG: 325 TABLET ORAL at 02:57

## 2021-01-17 RX ADMIN — ACETAMINOPHEN 650 MG: 325 TABLET ORAL at 16:31

## 2021-01-17 RX ADMIN — Medication 10 ML: at 06:58

## 2021-01-17 RX ADMIN — ATORVASTATIN CALCIUM 20 MG: 20 TABLET, FILM COATED ORAL at 09:26

## 2021-01-17 RX ADMIN — LISINOPRIL 20 MG: 20 TABLET ORAL at 09:26

## 2021-01-17 RX ADMIN — ACETAMINOPHEN 650 MG: 325 TABLET ORAL at 22:22

## 2021-01-17 RX ADMIN — ESCITALOPRAM OXALATE 10 MG: 10 TABLET ORAL at 09:26

## 2021-01-17 RX ADMIN — Medication 10 ML: at 16:35

## 2021-01-17 RX ADMIN — Medication 10 ML: at 22:23

## 2021-01-17 RX ADMIN — BUPROPION HYDROCHLORIDE 150 MG: 150 TABLET, EXTENDED RELEASE ORAL at 09:26

## 2021-01-17 RX ADMIN — Medication 1 CAPSULE: at 09:26

## 2021-01-17 RX ADMIN — Medication 10 ML: at 16:36

## 2021-01-17 RX ADMIN — Medication 1 TABLET: at 09:26

## 2021-01-17 RX ADMIN — CHOLECALCIFEROL TAB 125 MCG (5000 UNIT) 5000 UNITS: 125 TAB at 09:26

## 2021-01-17 RX ADMIN — ACETAMINOPHEN 650 MG: 325 TABLET ORAL at 09:38

## 2021-01-17 NOTE — PROGRESS NOTES
Orthopaedic Progress Note      2021 11:12 AM     Patient: Maki Rodriguez MRN: [id number]  SSN: [id number]    YOB: 1934  Age: 80 y.o. Sex: female      Admit date:  1/15/2021  Admitting Physician:  Alyson Baltazar MD   Consulting Physician(s): Treatment Team: Attending Provider: Grabiel Mata MD; Consulting Provider: Jessy Solis MD; Primary Nurse: Hosea Coombs; Utilization Review: [facility]    SUBJECTIVE:     Maki Rodriguez is a 80 y.o. female is resting in bed. She complains of neck pain this morning. No complaints of nausea, vomiting, dizziness, lightheadedness, chest pain, or shortness of breath. OBJECTIVE:       Physical Exam:  General: Alert, cooperative, no distress. Respiratory: Respirations unlabored  Neurological:  Neurovascular exam within normal limits. Motor: + DF/PF. Musculoskeletal: Calves soft, supple, non-tender upon palpation. Cervical collar is in place. Readjusted collar for better fit. Able to move all extremities. She has no pain with left or right shoulder ROM. Left and right bicep is 5/5. Left and right tricep is 5/5. There is mild pain to palpation of the cervical spinous processes. There is no palpable stepoff. Negative Elias's bilaterally. BL LE: 5/5 throughout. No ankle clonus. 3+ bicep reflex bilaterally.   +radial and ulnar pulses BL     Vital Signs:        Patient Vitals for the past 8 hrs:   BP Temp Pulse Resp SpO2   21 0727 111/76 97.9 °F (36.6 °C) 85 16 92 %   21 0324 119/75 98 °F (36.7 °C) 81 16 93 %                                          Temp (24hrs), Av.1 °F (36.7 °C), Min:97.9 °F (36.6 °C), Max:98.6 °F (37 °C)      Labs:        Recent Labs     21  0248 21  0158   HCT 44.7 44.4   HGB 14.4 13.6   INR  --  1.2*     Lab Results   Component Value Date/Time    Sodium 135 (L) 2021 02:48 AM    Potassium 4.3 2021 02:48 AM    Chloride 102 2021 02:48 AM    CO2 27 01/17/2021 02:48 AM    Glucose 127 (H) 01/17/2021 02:48 AM    BUN 14 01/17/2021 02:48 AM    Creatinine 0.80 01/17/2021 02:48 AM    Calcium 9.3 01/17/2021 02:48 AM       PT/OT:                Patient mobility                         ASSESSMENT / PLAN:   Principal Problem:    Odontoid fracture with type II morphology and anterior displacement ([facility] [address] (1/16/2021)    Active Problems:    Falls (1/16/2021)      Closed anterior displaced type II dens fracture ([address] (1/16/2021)      HTN (hypertension) (1/16/2021)      Hyperlipidemia (1/16/2021)      Anxiety and depression (1/16/2021)      Dementia ([address] (1/16/2021)       A: 1. Type II odontoid fracture, minimally displaced chronic  2. Debility  3. Dementia    P: 1. She is neurologically intact and this appears to be a chronic fracture on the MRI. She has never been immobilized by reviewing her history and this is the first step in the treatment for this. She needs to wear her aspen collar until followup with Dr. Ashley Blackburn. She will need repeat imaging in 10-14 days and if improvement likely DC collar based on findings in the office. She needs placement- long term care likely. Orthopedics will sign off please reconsult as needed.         Signed By:  Colonel Cespedes, [address] [id number]

## 2021-01-17 NOTE — PROGRESS NOTES
MRI reviewed. Odontoid fracture is type 2 A without significant displacement and represents chronic fracture. There is no edema and there is no ligamentous instability. It is not a nonunion currently. Continue with Aspen hardcollar until followup. If she is unable to tolerate can consider DC, but has not healed her fracture without immobilization. Followup with Dr. Nicholas Calero in 10-14 days. Will need repeat imaging at that time. Neurovascular checks per unit routine. May ambulate with PT and WBAT.       BISHNU NassarC  [facility]  [facility]

## 2021-01-17 NOTE — PROGRESS NOTES
Problem: Mobility Impaired (Adult and Pediatric)  Goal: *Acute Goals and Plan of Care (Insert Text)  Description: FUNCTIONAL STATUS PRIOR TO ADMISSION: Patient was modified independent using a rolling walker for functional mobility. HOME SUPPORT PRIOR TO ADMISSION: The patient lived with daughter. Physical Therapy Goals  Initiated 1/17/2021  1. Patient will move from supine to sit and sit to supine  in bed with modified independence within 7 day(s). 2.  Patient will transfer from bed to chair and chair to bed with supervision/set-up using the least restrictive device within 7 day(s). 3.  Patient will perform sit to stand with supervision/set-up within 7 day(s). 4.  Patient will ambulate with supervision/set-up for 50 feet with the least restrictive device within 7 day(s). 5.  Patient will ascend/descend 14 stairs with bilateral handrail(s) with minimal assistance/contact guard assist within 7 day(s). PHYSICAL THERAPY EVALUATION  Patient: Tarun Rizvi (84 y.o. female)  Date: 1/17/2021  Primary Diagnosis: Odontoid fracture with type II morphology and anterior displacement ([address] [D87.689J]  Falls [W19. XXXA]  Closed odontoid fracture with type II morphology and anterior displacement, initial encounter ([address] [S12.110A]       Precautions: Falls risk, odontoid fx  Fall, Skin    ASSESSMENT  Based on the objective data described below, the patient presents with decreased independence with bed mobility, transfers, ambulation, and static/dynamic standing balance. Pt currently requires CGA for bed mobility tasks and min A to mod A for transfers and scooting. Standing static balance is poor with constant support of RW and requires consistent TC to shift weight anteriorly. Pt requires Min A for ambulation requiring consistent VC to increase step height and widen SERAFIN. Intermittent L sided neck pain occurred during completion of mobility tasks resulting in momentary pauses for relief.  Pt also c/o R groin pain with ambulation and weight bearing. Conversed with pt's daughter regarding plan of care and a SNF was mutually agreed to be appropriate at this time in order to facilitate a safe return to previous living environment. Current Level of Function Impacting Discharge (mobility/balance): Impaired standing balance, decreased functional activity tolerance, generalized weakness    Functional Outcome Measure: The patient scored 3/28 on the Tinetti outcome measure which is indicative of high falls risk. Other factors to consider for discharge: Pt must negotiate 14 stairs to navigate daughter's home, strong family support, 3 falls directly PTA     Patient will benefit from skilled therapy intervention to address the above noted impairments. PLAN :  Recommendations and Planned Interventions: bed mobility training, transfer training, gait training, therapeutic exercises, patient and family training/education, and therapeutic activities      Frequency/Duration: Patient will be followed by physical therapy:  5x week to address goals. Recommendation for discharge: (in order for the patient to meet his/her long term goals)  Therapy up to 5 days/week in SNF setting    This discharge recommendation:  Has been made in collaboration with the attending provider and/or case management    IF patient discharges home will need the following DME: none         SUBJECTIVE:   Patient stated The L side of my neck hurts.     OBJECTIVE DATA SUMMARY:   HISTORY:    No past medical history on file. No past surgical history on file. Personal factors and/or comorbidities impacting plan of care:  Odontoid fx, history of frequent falls    Home Situation  Home Environment: Private residence  # Steps to Enter: 5  Rails to Enter: Yes  One/Two Story Residence: Two story  # of Interior Steps: 14  Lift Chair Available: No  Living Alone: No  Support Systems: Family member(s)  Patient Expects to be Discharged to[de-identified] Private residence  Current DME Used/Available at Home: Brace/Splint, Walker, rolling    EXAMINATION/PRESENTATION/DECISION MAKING:   Critical Behavior:  Neurologic State: Alert  Orientation Level: Oriented to time, Oriented to situation, Oriented to place, Oriented to person  Cognition: Follows commands     Hearing: Auditory  Auditory Impairment: None    Range Of Motion:  AROM: Generally decreased, functional           PROM: Generally decreased, functional           Strength:    Strength: Generally decreased, functional                    Tone & Sensation:                                  Coordination:  Coordination: Within functional limits  Vision:      Functional Mobility:  Bed Mobility:  Rolling: Contact guard assistance  Supine to Sit: Minimum assistance; Additional time(Trunk management)     Scooting: Moderate assistance(Lateral trunk lean to unweight hips)  Transfers:  Sit to Stand: Minimum assistance; Additional time  Stand to Sit: Minimum assistance(Poor eccentric control)  Stand Pivot Transfers: Minimum assistance; Additional time;Assist x1     Bed to Chair: Minimum assistance; Additional time;Assist x1              Balance:   Sitting: Intact; With support  Standing: Impaired; With support(Posterior weight shift)  Standing - Static: Poor;Constant support  Standing - Dynamic : Constant support;Poor  Ambulation/Gait Training:  Distance (ft): 10 Feet (ft)  Assistive Device: Gait belt;Walker, rolling  Ambulation - Level of Assistance: Additional time;Minimal assistance;Assist x1     Gait Description (WDL): Exceptions to WDL  Gait Abnormalities: Decreased step clearance;Shuffling gait; Step to gait; Festinating gait  Right Side Weight Bearing: Full  Left Side Weight Bearing: Full  Base of Support: Narrowed  Stance: Left increased;Right increased  Speed/Tatianna: Delayed;Pace decreased (<100 feet/min); Slow;Shuffled  Step Length: Left shortened;Right shortened  Swing Pattern: Left asymmetrical;Right asymmetrical    Functional Measure:  Tinetti test:    Sitting Balance: 0  Arises: 0  Attempts to Rise: 0  Immediate Standing Balance: 0  Standing Balance: 0  Nudged: 0  Eyes Closed: 0  Turn 360 Degrees - Continuous/Discontinuous: 0  Turn 360 Degrees - Steady/Unsteady: 0  Sitting Down: 1  Balance Score: 1 Balance total score  Indication of Gait: 0  R Step Length/Height: 0  L Step Length/Height: 0  R Foot Clearance: 0  L Foot Clearance: 0  Step Symmetry: 0  Step Continuity: 0  Path: 1  Trunk: 1  Walking Time: 0  Gait Score: 2 Gait total score  Total Score: 3/28 Overall total score         Tinetti Tool Score Risk of Falls  <19 = High Fall Risk  19-24 = Moderate Fall Risk  25-28 = Low Fall Risk  Tinetti ME. Performance-Oriented Assessment of Mobility Problems in Elderly Patients. [facility] 66; [id number].  (Scoring Description: PT Bulletin Feb. 10, 1993)    Older adults: Kenroy Jessica et al, 2009; n = 1000 [facility] elderly evaluated with ABC, LAM, ADL, and IADL)  · Mean LAM score for males aged 69-68 years = 26.21(3.40)  · Mean LAM score for females age 69-68 years = 25.16(4.30)  · Mean LAM score for males over 80 years = 23.29(6.02)  · Mean LAM score for females over 80 years = 17.20(8.32)           Physical Therapy Evaluation Charge Determination   History Examination Presentation Decision-Making   LOW Complexity : Zero comorbidities / personal factors that will impact the outcome / POC LOW Complexity : 1-2 Standardized tests and measures addressing body structure, function, activity limitation and / or participation in recreation  LOW Complexity : Stable, uncomplicated  Other outcome measures Tinetti  LOW       Based on the above components, the patient evaluation is determined to be of the following complexity level: LOW     Pain Ratin/10 neck pain at rest    Activity Tolerance:   Poor and requires frequent rest breaks    After treatment patient left in no apparent distress:   Sitting in chair, Call bell within reach, and Bed / chair alarm activated    COMMUNICATION/EDUCATION:   The patients plan of care was discussed with: Registered nurse. Fall prevention education was provided and the patient/caregiver indicated understanding., Patient/family have participated as able in goal setting and plan of care. , and Patient/family agree to work toward stated goals and plan of care.     Thank you for this referral.  Nessa Hanna, PT   Time Calculation: 28 mins          Outcome: Progressing Towards Goal

## 2021-01-17 NOTE — PROGRESS NOTES
Bedside shift change report given to Kathleen Meza RN (oncoming nurse) by Arash Reaves RN (offgoing nurse). Report included the following information SBAR, Kardex, MAR, Accordion and Recent Results.

## 2021-01-17 NOTE — PROGRESS NOTES
[facility]  [address] [facility], [place], [address] [place] [address]  [phone]      Medical Progress Note      NAME: Nicole Caba   :  1934  MRM:  [id number]    Date of service: 2021  10:20 AM       Assessment and Plan:   1. Odontoid fracture with type II morphology and anterior displacement ([address] (2021)/ Falls (2021). 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 No spinal cord abnormalities. Pain management. Fall precaution. evaluated by orthopedics and recommended outpatient FU with Dr. Nirav Diaz in 10-14. May need to repeat imaging at that time.      2. HTN (hypertension) (2021). Continue lisinopril     3. Hyperlipidemia (2021). On statin     4. Anxiety and depression (2021)/ Dementia ([facility] [address] (2021). Continue Wellbutrin and Lexapro. Need to be evaluated by neuropsychologist as an outpatient to evaluate cognition.     CODE STATUS: Full          Subjective:     Chief Complaint[de-identified] Patient was seen and examined as a follow up for odontoid frature. Chart was reviewed. c/o uncomfortable collar     ROS:  (bold if positive, if negative)    Tolerating PT  Tolerating Diet        Objective:     Last 24hrs VS reviewed since prior progress note.  Most recent are:    Visit Vitals  /76 (BP 1 Location: Left arm, BP Patient Position: At rest)   Pulse 85   Temp 97.9 °F (36.6 °C)   Resp 16   Wt 55.8 kg (123 lb)   SpO2 92%     SpO2 Readings from Last 6 Encounters:   21 92%            Intake/Output Summary (Last 24 hours) at 2021 1020  Last data filed at 2021 0307  Gross per 24 hour   Intake 85 ml   Output 650 ml   Net -565 ml        Physical Exam:    Gen:  Well-developed, well-nourished, in no acute distress  HEENT:  Pink conjunctivae, PERRL, hearing intact to voice, moist mucous membranes  Neck:  Supple, without masses, thyroid non-tender  Resp:  No accessory muscle use, clear breath sounds without wheezes rales or rhonchi  Card:  No murmurs, normal S1, S2 without thrills, bruits or peripheral edema  Abd:  Soft, non-tender, non-distended, normoactive bowel sounds are present, no palpable organomegaly and no detectable hernias  Lymph:  No cervical or inguinal adenopathy  Musc:  No cyanosis or clubbing  Skin:  No rashes or ulcers, skin turgor is good  Neuro:  Cranial nerves are grossly intact, no focal motor weakness, follows commands appropriately  Psych:  poor insight,    __________________________________________________________________  Medications Reviewed: (see below)  Medications:     Current Facility-Administered Medications   Medication Dose Route Frequency    allopurinoL (ZYLOPRIM) tablet 100 mg  100 mg Oral DAILY    atorvastatin (LIPITOR) tablet 20 mg  20 mg Oral DAILY    buPROPion XL (WELLBUTRIN XL) tablet 150 mg  150 mg Oral 7am    escitalopram oxalate (LEXAPRO) tablet 10 mg  10 mg Oral DAILY    folic acid tablet 0.4 mg  0.4 mg Oral DAILY    lactobac ac& pc-s.therm-b.anim (KELLI Q/RISAQUAD)  1 Cap Oral DAILY    lisinopriL (PRINIVIL, ZESTRIL) tablet 20 mg  20 mg Oral DAILY    sodium chloride (NS) flush 5-40 mL  5-40 mL IntraVENous Q8H    sodium chloride (NS) flush 5-40 mL  5-40 mL IntraVENous PRN    acetaminophen (TYLENOL) tablet 650 mg  650 mg Oral Q6H PRN    Or    acetaminophen (TYLENOL) suppository 650 mg  650 mg Rectal Q6H PRN    polyethylene glycol (MIRALAX) packet 17 g  17 g Oral DAILY PRN    sodium chloride (NS) flush 5-40 mL  5-40 mL IntraVENous Q8H    sodium chloride (NS) flush 5-40 mL  5-40 mL IntraVENous PRN    acetaminophen (TYLENOL) tablet 650 mg  650 mg Oral Q6H PRN    Or    acetaminophen (TYLENOL) suppository 650 mg  650 mg Rectal Q6H PRN    polyethylene glycol (MIRALAX) packet 17 g  17 g Oral DAILY PRN    cholecalciferol (VITAMIN D3) tablet 5,000 Units  5,000 Units Oral DAILY    And    cholecalciferol (VITAMIN D3) (1000 Units /25 mcg) tablet 1 Tab  1,000 Units Oral DAILY        Lab Data Reviewed: (see below)  Lab Review:     Recent Labs     01/17/21 0248 01/16/21 0158   WBC 12.3* 11.8*   HGB 14.4 13.6   HCT 44.7 44.4    198     Recent Labs     01/17/21 0248 01/16/21 0158   * 136   K 4.3 4.4    101   CO2 27 30   * 109*   BUN 14 22*   CREA 0.80 0.80   CA 9.3 8.9   ALB  --  3.5   TBILI  --  0.5   ALT  --  38   INR  --  1.2*     No results found for: GLUCPOC  No results for input(s): PH, PCO2, PO2, HCO3, FIO2 in the last 72 hours. Recent Labs     01/16/21 0158   INR 1.2*     All Micro Results     None          I have reviewed notes of prior 24hr. Other pertinent lab: Total time spent with patient: 30 Minutes I personally reviewed chart, notes, data and current medications in the medical record. I have personally examined and treated the patient at bedside during this period.                  Care Plan discussed with: Patient, Family, Nursing Staff and >50% of time spent in counseling and coordination of care    Discussed:  Care Plan    Prophylaxis:  Lovenox    Disposition:  SNF/LTC           ___________________________________________________    Attending Physician: Josie Lee MD

## 2021-01-17 NOTE — PROGRESS NOTES
Bedside and Verbal shift change report given to Arash Reaves RN (oncoming nurse) by Milka Sharma (offgoing nurse). Report included the following information SBAR, Kardex, Procedure Summary, Intake/Output, MAR, Recent Results and Med Rec Status.

## 2021-01-18 ENCOUNTER — APPOINTMENT (OUTPATIENT)
Dept: GENERAL RADIOLOGY | Age: 86
DRG: 552 | End: 2021-01-18
Attending: INTERNAL MEDICINE
Payer: MEDICARE

## 2021-01-18 PROCEDURE — 97530 THERAPEUTIC ACTIVITIES: CPT | Performed by: OCCUPATIONAL THERAPIST

## 2021-01-18 PROCEDURE — 97535 SELF CARE MNGMENT TRAINING: CPT | Performed by: OCCUPATIONAL THERAPIST

## 2021-01-18 PROCEDURE — 97110 THERAPEUTIC EXERCISES: CPT

## 2021-01-18 PROCEDURE — 77030038269 HC DRN EXT URIN PURWCK BARD -A

## 2021-01-18 PROCEDURE — 65270000029 HC RM PRIVATE

## 2021-01-18 PROCEDURE — 87635 SARS-COV-2 COVID-19 AMP PRB: CPT

## 2021-01-18 PROCEDURE — 97165 OT EVAL LOW COMPLEX 30 MIN: CPT | Performed by: OCCUPATIONAL THERAPIST

## 2021-01-18 PROCEDURE — 74011250636 HC RX REV CODE- 250/636: Performed by: FAMILY MEDICINE

## 2021-01-18 PROCEDURE — 74011250637 HC RX REV CODE- 250/637: Performed by: FAMILY MEDICINE

## 2021-01-18 PROCEDURE — 71045 X-RAY EXAM CHEST 1 VIEW: CPT

## 2021-01-18 PROCEDURE — 97116 GAIT TRAINING THERAPY: CPT

## 2021-01-18 RX ORDER — SODIUM CHLORIDE 9 MG/ML
125 INJECTION, SOLUTION INTRAVENOUS CONTINUOUS
Status: DISCONTINUED | OUTPATIENT
Start: 2021-01-18 | End: 2021-01-21 | Stop reason: HOSPADM

## 2021-01-18 RX ADMIN — Medication 10 ML: at 21:50

## 2021-01-18 RX ADMIN — ATORVASTATIN CALCIUM 20 MG: 20 TABLET, FILM COATED ORAL at 09:21

## 2021-01-18 RX ADMIN — Medication 0.4 MG: at 09:21

## 2021-01-18 RX ADMIN — Medication 1 CAPSULE: at 09:21

## 2021-01-18 RX ADMIN — Medication 1 TABLET: at 09:21

## 2021-01-18 RX ADMIN — Medication 10 ML: at 06:00

## 2021-01-18 RX ADMIN — SODIUM CHLORIDE 125 ML/HR: 9 INJECTION, SOLUTION INTRAVENOUS at 02:13

## 2021-01-18 RX ADMIN — BUPROPION HYDROCHLORIDE 150 MG: 150 TABLET, EXTENDED RELEASE ORAL at 09:21

## 2021-01-18 RX ADMIN — ESCITALOPRAM OXALATE 10 MG: 10 TABLET ORAL at 09:21

## 2021-01-18 RX ADMIN — CHOLECALCIFEROL TAB 125 MCG (5000 UNIT) 5000 UNITS: 125 TAB at 09:22

## 2021-01-18 RX ADMIN — ALLOPURINOL 100 MG: 100 TABLET ORAL at 09:21

## 2021-01-18 RX ADMIN — ACETAMINOPHEN 650 MG: 325 TABLET ORAL at 09:21

## 2021-01-18 RX ADMIN — SODIUM CHLORIDE 125 ML/HR: 9 INJECTION, SOLUTION INTRAVENOUS at 20:14

## 2021-01-18 RX ADMIN — ACETAMINOPHEN 650 MG: 325 TABLET ORAL at 21:49

## 2021-01-18 RX ADMIN — LISINOPRIL 20 MG: 20 TABLET ORAL at 09:21

## 2021-01-18 NOTE — PROGRESS NOTES
Dillon Hillelsen [facility] 79  [address] [facility], Wing, [address] [place] [address]  [phone]      Medical Progress Note      NAME: Neva Acosta   :  1934  MRM:  [id number]    Date of service: 2021  10:20 AM       Assessment and Plan:    Odontoid fracture with type II morphology and anterior displacement ([address] (2021)/ Falls (2021). 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 No spinal cord abnormalities. Pain management. Fall precaution. evaluated by orthopedics and recommended outpatient FU with Dr. Kylie Delgadillo in 10-14. May need to repeat imaging at that time.       HTN (hypertension) (2021). Continue lisinopril      Hyperlipidemia (2021). On statin       Anxiety and depression (2021)/ Dementia ([facility] [address] (2021). Continue Wellbutrin and Lexapro. Need to be evaluated by neuropsychologist as an outpatient to evaluate cognition.     CODE STATUS: Full          Subjective:     Chief Complaint[de-identified] f/u falls  ROS:  (bold if positive, if negative)    Tolerating PT  Tolerating Diet        Objective:     Last 24hrs VS reviewed since prior progress note.  Most recent are:    Visit Vitals  BP (!) 108/57 (BP 1 Location: Left arm, BP Patient Position: At rest;Supine)   Pulse 73   Temp 98.8 °F (37.1 °C)   Resp 16   Wt 55.8 kg (123 lb)   SpO2 94%     SpO2 Readings from Last 6 Encounters:   21 94%            Intake/Output Summary (Last 24 hours) at 2021 1647  Last data filed at 2021 1246  Gross per 24 hour   Intake 250 ml   Output 600 ml   Net -350 ml        Physical Exam:    Gen:  Well-developed, well-nourished, in no acute distress  HEENT:  Pink conjunctivae, PERRL, hearing intact to voice, moist mucous membranes  Neck:  Supple, without masses, thyroid non-tender  Resp:  No accessory muscle use, clear breath sounds without wheezes rales or rhonchi  Card:  No murmurs, normal S1, S2 without thrills, bruits or peripheral edema  Abd:  Soft, non-tender, non-distended, normoactive bowel sounds are present, no palpable organomegaly and no detectable hernias  Lymph:  No cervical or inguinal adenopathy  Musc:  No cyanosis or clubbing  Skin:  No rashes or ulcers, skin turgor is good  Neuro:  Cranial nerves are grossly intact, no focal motor weakness, follows commands appropriately  Psych:  poor insight,    __________________________________________________________________  Medications Reviewed: (see below)  Medications:     Current Facility-Administered Medications   Medication Dose Route Frequency    0.9% sodium chloride infusion  125 mL/hr IntraVENous CONTINUOUS    allopurinoL (ZYLOPRIM) tablet 100 mg  100 mg Oral DAILY    atorvastatin (LIPITOR) tablet 20 mg  20 mg Oral DAILY    buPROPion XL (WELLBUTRIN XL) tablet 150 mg  150 mg Oral 7am    escitalopram oxalate (LEXAPRO) tablet 10 mg  10 mg Oral DAILY    folic acid tablet 0.4 mg  0.4 mg Oral DAILY    lactobac ac& pc-s.therm-b.anim (KELLI Q/RISAQUAD)  1 Cap Oral DAILY    lisinopriL (PRINIVIL, ZESTRIL) tablet 20 mg  20 mg Oral DAILY    sodium chloride (NS) flush 5-40 mL  5-40 mL IntraVENous Q8H    sodium chloride (NS) flush 5-40 mL  5-40 mL IntraVENous PRN    acetaminophen (TYLENOL) tablet 650 mg  650 mg Oral Q6H PRN    Or    acetaminophen (TYLENOL) suppository 650 mg  650 mg Rectal Q6H PRN    polyethylene glycol (MIRALAX) packet 17 g  17 g Oral DAILY PRN    sodium chloride (NS) flush 5-40 mL  5-40 mL IntraVENous Q8H    sodium chloride (NS) flush 5-40 mL  5-40 mL IntraVENous PRN    acetaminophen (TYLENOL) tablet 650 mg  650 mg Oral Q6H PRN    Or    acetaminophen (TYLENOL) suppository 650 mg  650 mg Rectal Q6H PRN    polyethylene glycol (MIRALAX) packet 17 g  17 g Oral DAILY PRN    cholecalciferol (VITAMIN D3) tablet 5,000 Units  5,000 Units Oral DAILY    And    cholecalciferol (VITAMIN D3) (1000 Units /25 mcg) tablet 1 Tab  1,000 Units Oral DAILY Lab Data Reviewed: (see below)  Lab Review:     Recent Labs     01/17/21 0248 01/16/21 0158   WBC 12.3* 11.8*   HGB 14.4 13.6   HCT 44.7 44.4    198     Recent Labs     01/17/21 0248 01/16/21 0158   * 136   K 4.3 4.4    101   CO2 27 30   * 109*   BUN 14 22*   CREA 0.80 0.80   CA 9.3 8.9   ALB  --  3.5   TBILI  --  0.5   ALT  --  38   INR  --  1.2*     No results found for: GLUCPOC  No results for input(s): PH, PCO2, PO2, HCO3, FIO2 in the last 72 hours. Recent Labs     01/16/21 0158   INR 1.2*     All Micro Results     None          I have reviewed notes of prior 24hr. Other pertinent lab: Total time spent with patient: 30 Minutes I personally reviewed chart, notes, data and current medications in the medical record. I have personally examined and treated the patient at bedside during this period.                  Care Plan discussed with: Patient, Family, Nursing Staff and >50% of time spent in counseling and coordination of care    Discussed:  Care Plan    Prophylaxis:  Lovenox    Disposition:  SNF/LTC           ___________________________________________________    Attending Physician: Taylor Chua MD

## 2021-01-18 NOTE — PROGRESS NOTES
Problem: Self Care Deficits Care Plan (Adult)  Goal: *Acute Goals and Plan of Care (Insert Text)  Description:   FUNCTIONAL STATUS PRIOR TO ADMISSION: patient has been staying with her daughter since 11/20 due to increased confusion, sun-downing and her  unable to care for her and unable to afford 24/7 care. Patient recently had home health PT, hx of falls and always falls backwards    HOME SUPPORT: The patient lived with her daughter. Occupational Therapy Goals  Initiated 1/18/2021  1. Patient will maintain sitting balance at edge of bed > or = 8 minutes without loss of balance with supervision/set-up within 7 day(s). 2.  Patient will  maintain static standing balance with bilateral UE support > or = 1 minute with moderate assistance  within 7 day(s). 3.  Patient will perform toilet transfers with minimal assistance/contact guard assist to and from bedside commode and best technique within 7 day(s). 4.  Patient will perform all aspects of toileting with maximal assistance within 7 day(s). 5.  Patient will perform grooming with set-up seated in chair within 7 day(s). Outcome: Not Met    OCCUPATIONAL THERAPY EVALUATION  Patient: Shiela Ortiz (53 y.o. female)  Date: 1/18/2021  Primary Diagnosis: Odontoid fracture with type II morphology and anterior displacement ([address] [Y65.438F]  Falls [W19. XXXA]  Closed odontoid fracture with type II morphology and anterior displacement, initial encounter ([address] [S12.110A]        Precautions:   Fall, Other (comment)(Aspen collar )    ASSESSMENT  Based on the objective data described below, the patient presents with significant confusion and poor historian, called patient's daughter after assessment per her request via note left in room. Patient with loss of balance posteriorly sitting edge of bed and unable to facilitate static standing balance with bilateral UE support. Unable to use walker to pivot to chair and completed via squat pivot transfer. ?  Disposition, daughter inquiring if she can walk up 14 steps to her room,  at this time not likely. Patient did not demonstrate any pain and with Aspen collar in place. Noted coughing with eating and informed RN, ? SLP consult    Current Level of Function Impacting Discharge (ADLs/self-care): total care due to cognitive status, decreased balance    Functional Outcome Measure: The patient scored 5/100 on the Barthel Index outcome measure   Other factors to consider for discharge: ? Memory care unit, SNF, home with daughter     Patient will benefit from skilled therapy intervention to address the above noted impairments. PLAN :  Recommendations and Planned Interventions: self care training, functional mobility training, therapeutic exercise, balance training, therapeutic activities, cognitive retraining, endurance activities, patient education, home safety training, and family training/education    Frequency/Duration: Patient will be followed by occupational therapy 3 times a week to address goals. Recommendation for discharge: (in order for the patient to meet his/her long term goals)  To be determined: pending disposition and plan of care, ? SNF, memory care    This discharge recommendation:  Has not yet been discussed the attending provider and/or case management    IF patient discharges home will need the following DME:        SUBJECTIVE:   Patient stated I live with my , my daughter lives in [place].  has been staying with her daughter since 11/20    OBJECTIVE DATA SUMMARY:   HISTORY:   No past medical history on file. No past surgical history on file.     Expanded or extensive additional review of patient history:     Home Situation  Home Environment: Private residence  # Steps to Enter: 5  Rails to Enter: Yes  One/Two Story Residence: Two story  # of Interior Steps: 14  Lift Chair Available: No  Living Alone: No  Support Systems: Family member(s)  Patient Expects to be Discharged [facility] [employer] residence  Current DME Used/Available at Home: Brace/Splint, Walker, rolling    Hand dominance: Right    EXAMINATION OF PERFORMANCE DEFICITS:  Cognitive/Behavioral Status:  Neurologic State: Alert;Confused  Orientation Level: Disoriented to situation;Disoriented to time;Oriented to person;Oriented to place  Cognition: Memory loss;Poor safety awareness; Follows commands  Perception: Appears intact  Perseveration: Perseverates during conversation  Safety/Judgement: Decreased awareness of environment;Lack of insight into deficits; Decreased awareness of need for assistance;Decreased awareness of need for safety    Skin: intact    Edema: none noted    Hearing: Auditory  Auditory Impairment: Hard of hearing, bilateral, Hearing aid(s)  Hearing Aids/Status: At home    Vision/Perceptual:                      Range of Motion:  AROM: Generally decreased, functional           Strength:  Strength: Generally decreased, functional           Coordination:  Coordination: Generally decreased, functional(arthritic changes)  Fine Motor Skills-Upper: Left Impaired;Right Impaired              Balance:  Sitting: Impaired  Sitting - Static: Fair (occasional); Other (comment)(posterior loss of balance)  Sitting - Dynamic: Poor (constant support)  Standing: Impaired; With support  Standing - Static: Constant support;Poor; Other (comment)(posterior lean)  Standing - Dynamic : Poor    Functional Mobility and Transfers for ADLs:  Bed Mobility:  Rolling: Moderate assistance  Supine to Sit: Moderate assistance;Maximum assistance;Assist x1;Additional time  Scooting: Maximum assistance;Assist x1    Transfers:  Sit to Stand: Maximum assistance;Assist x1;Other (comment)(posterior lean and decreased support left LE)  Stand to Sit: Moderate assistance;Assist x1  Bed to Chair: Moderate assistance;Assist x1;Other (comment)(squat pivot to left, unable to use rolling walker)  Bathroom Mobility: Dependent/total assistance  Toilet Transfer :  Moderate assistance;Assist x1;Other (comment)(squat pivot to bedside commode)    Standing edge of bed, attempted to facilitate balance, patient with posterior lean and cued to bring left LE under her to increase support, completed with increased time and mod assist    ADL Assessment:  Feeding: Maximum assistance; Other (comment)(decreased ability to locate mouth, noted coughing)    Oral Facial Hygiene/Grooming: Maximum assistance    Bathing: Total assistance    Upper Body Dressing: Other (comment); Moderate assistance;Maximum assistance    Lower Body Dressing: Total assistance    Toileting: Total assistance; Other (comment)(pur-wic)           ADL Intervention and task modifications:        Educated on role of OT, re-oriented to place, situation, time, use of call bell    Provided briefs and donned with max assist over feet and total assist over hips in standing    Adjusted Aspen collar at midline, instructed on positioning    Feeding: increased assist to locate mouth, noted increased coughing  RN informed and food moved away    Cognitive Retraining  Safety/Judgement: Decreased awareness of environment;Lack of insight into deficits; Decreased awareness of need for assistance;Decreased awareness of need for safety       Functional Measure:  Barthel Index:    Bathin  Bladder: 0  Bowels: 0  Groomin  Dressin  Feedin  Mobility: 0  Stairs: 0  Toilet Use: 0  Transfer (Bed to Chair and Back): 5  Total: 5/100        The Barthel ADL Index: Guidelines  1. The index should be used as a record of what a patient does, not as a record of what a patient could do. 2. The main aim is to establish degree of independence from any help, physical or verbal, however minor and for whatever reason. 3. The need for supervision renders the patient not independent. 4. A patient's performance should be established using the best available evidence.  Asking the patient, friends/relatives and nurses are the usual sources, but direct observation and common sense are also important. However direct testing is not needed. 5. Usually the patient's performance over the preceding 24-48 hours is important, but occasionally longer periods will be relevant. 6. Middle categories imply that the patient supplies over 50 per cent of the effort. 7. Use of aids to be independent is allowed. Casey Zamora., Barthel, D.W. (0098). Functional evaluation: the Barthel Index. [facility] (14)2. PANCHITO Cobos, Ale Abdalla., Addis Forrester., Layne Tanvi, [facility] (1999). Measuring the change indisability after inpatient rehabilitation; comparison of the responsiveness of the Barthel Index and Functional [facility] Measure. Journal of Neurology, Neurosurgery, and Psychiatry, 66(4), 565-604. LACIE Moreno, LILIAN Pedersen, & Jean Carlos Mota M.A. (2004.) Assessment of post-stroke quality of life in cost-effectiveness studies: The usefulness of the Barthel Index and the EuroQoL-5D. Quality of Life Research, 15, 504-42         Occupational Therapy Evaluation Charge Determination   History Examination Decision-Making   MEDIUM Complexity : Expanded review of history including physical, cognitive and psychosocial  history  HIGH Complexity : 5 or more performance deficits relating to physical, cognitive , or psychosocial skils that result in activity limitations and / or participation restrictions HIGH Complexity : Patient presents with comorbidities that affect occupational performance.  Signifigant modification of tasks or assistance (eg, physical or verbal) with assessment (s) is necessary to enable patient to complete evaluation       Based on the above components, the patient evaluation is determined to be of the following complexity level: MEDIUM  Pain Rating:  No complaint or sign    Activity Tolerance:   Fair    After treatment patient left in no apparent distress:    Sitting in chair, Call bell within reach, and Bed / chair alarm activated    COMMUNICATION/EDUCATION:   The patients plan of care was discussed with: Physical therapist, Speech therapist, and Registered nurse. Patient is unable to participate in goal setting and plan of care. This patients plan of care is appropriate for delegation to ELGIN.     Thank you for this referral.  Ellen Hensley, OTR/L  Time Calculation: 43 mins

## 2021-01-18 NOTE — PROGRESS NOTES
Bedside shift change report given to Luis Alfredo Koo RN (oncoming nurse) by Stalin Silveira RN (offgoing nurse). Report included the following information SBAR, Kardex, MAR, Accordion and Recent Results.

## 2021-01-18 NOTE — PROGRESS NOTES
CM Note:  Pt is hard of hearing and has ST memory loss. I spoke with her daughter, Isai Wagoner ([phone]) for d/c planning. PTA pt had been living with her  in [place].  He is unable to care for pt who came to live with her dtr. Pt had been able to negotiate the stairs to get to her BR. Her dtr stated pt has balance issues and gets severe sundowning in addition to the aforementioned deficits. Reason for Admission:   Odontoid fracture                   RUR Score:     11%                Plan for utilizing home health:   snf recommended; pt had [facility] home health for PT in the recent past.  Her daughter has [facility] for 8 hr/day for the pt while dtr is at work       PCP: First and Last name:     Name of Practice:    Are you a current patient: Yes/No:    Approximate date of last visit:    Can you participate in a virtual visit with your PCP:                     Current Advanced Directive/Advance Care Plan: none                         Transition of Care Plan:     1. Pt's dtr chose [facility] and [facility] for snf placement; referrals were sent in [address] [place] [address] link  2. Will need 2 covid test and CXR  3. PT/OT following  4. CM following for any needs  FANTASMA Pastor, RN    Care Management Interventions  PCP Verified by CM: No  Mode of Transport at Discharge:  Other (see comment)(to be determined)  Transition of Care Consult (CM Consult): SNF  Partner SNF: (S) Yes  Discharge Durable Medical Equipment: No  Physical Therapy Consult: Yes  Occupational Therapy Consult: Yes  Current Support Network: Relative's Home(Lives with her daughter in a 2 story house with 14 interior stairs and 4 entry steps)  Confirm Follow Up Transport: Other (see comment)(to be determined)  Freedom of Choice List was Provided with Basic Dialogue that Supports the Patient's Individualized Plan of Care/Goals, Treatment Preferences and Shares the Quality Data Associated with the Providers?: (S) Yes

## 2021-01-18 NOTE — PROGRESS NOTES
Problem: Mobility Impaired (Adult and Pediatric)  Goal: *Acute Goals and Plan of Care (Insert Text)  Description: FUNCTIONAL STATUS PRIOR TO ADMISSION: Patient was modified independent using a rolling walker for functional mobility. HOME SUPPORT PRIOR TO ADMISSION: The patient lived with daughter. Physical Therapy Goals  Initiated 1/17/2021  1. Patient will move from supine to sit and sit to supine  in bed with modified independence within 7 day(s). 2.  Patient will transfer from bed to chair and chair to bed with supervision/set-up using the least restrictive device within 7 day(s). 3.  Patient will perform sit to stand with supervision/set-up within 7 day(s). 4.  Patient will ambulate with supervision/set-up for 50 feet with the least restrictive device within 7 day(s). 5.  Patient will ascend/descend 14 stairs with bilateral handrail(s) with minimal assistance/contact guard assist within 7 day(s). Outcome: Progressing Towards Goal   PHYSICAL THERAPY TREATMENT  Patient: Ria Gonzalez (61 y.o. female)  Date: 1/18/2021  Diagnosis: Odontoid fracture with type II morphology and anterior displacement ([facility] [address] [S12.110A]  Falls [W19. XXXA]  Closed odontoid fracture with type II morphology and anterior displacement, initial encounter ([facility] [address] [S12.110A] Odontoid fracture with type II morphology and anterior displacement (HCC)       Precautions: Fall, Other (comment)(Aspen collar )  Chart, physical therapy assessment, plan of care and goals were reviewed. ASSESSMENT  Patient continues with skilled PT services and is slowly progressing towards goals. Patient continues to demonstrated poor sitting and standing balance leaning heavily to the right and posteriorly. Patient also complaining of right groin pain with mobility and exercise efforts. Notified nursing of these complaints. BP running low but improved slightly with activity (see doc flow sheets).      Current Level of Function Impacting Discharge (mobility/balance): Received patient in recliner with Aspen collar in place, leaning to right; able to correct slightly with verbal cues. Patient stood with +2 mod assist and took a few steps with rolling walker to bed. Patient leaning again posteriorly and to the right and needing tactile and verbal cues to be safe. Once seated at edge of bed, she was able to maintain midline for a few minutes with high guard. Returned to supine with +2 min assist.  Patient performed BLE bed exercises, needing min assist with RLE due to pain in groin, rated at 4/10. Other factors to consider for discharge: Odontoid fracture; Aspen collar; poor sitting and standing balance, high fall risk, history of falls and sundowning. PLAN :  Patient continues to benefit from skilled intervention to address the above impairments. Continue treatment per established plan of care. to address goals. Recommendation for discharge: (in order for the patient to meet his/her long term goals)  Therapy up to 5 days/week in SNF setting    This discharge recommendation:  Has not yet been discussed the attending provider and/or case management    IF patient discharges home will need the following DME: none       SUBJECTIVE:   Patient stated I guess I'm doing ok; this is [facility].     OBJECTIVE DATA SUMMARY:   Critical Behavior:  Neurologic State: Alert, Confused  Orientation Level: Disoriented to situation, Disoriented to time, Oriented to person, Oriented to place  Cognition: Memory loss, Poor safety awareness, Follows commands  Safety/Judgement: Decreased awareness of environment, Lack of insight into deficits, Decreased awareness of need for assistance, Decreased awareness of need for safety  Functional Mobility Training:  Bed Mobility:  Rolling:  Moderate assistance  Supine to Sit: Minimum assistance  Sit to Supine: Assist x2;Minimum assistance  Scooting: Maximum assistance;Assist x1        Transfers:  Sit to Stand: Assist x2;Moderate assistance; Additional time  Stand to Sit: Assist x2;Minimum assistance        Bed to Chair: Assist x2; Moderate assistance                    Balance:  Sitting: Impaired  Sitting - Static: Fair (occasional)  Sitting - Dynamic: Poor (constant support)  Standing: Impaired  Standing - Static: Constant support  Standing - Dynamic : Constant support  Ambulation/Gait Training:  Distance (ft): 5 Feet (ft)  Assistive Device: Gait belt;Walker, rolling;Brace/Splint  Ambulation - Level of Assistance: Assist x2; Moderate assistance        Gait Abnormalities: Path deviations;Trunk sway increased;Decreased step clearance        Base of Support: Narrowed     Speed/Tatianna: Pace decreased (<100 feet/min)  Step Length: Left shortened;Right shortened                                  Therapeutic Exercises: Ankle pumps, heel slides, hip abduction  Pain Ratin/10 to neck and right groin    Activity Tolerance:   Fair    After treatment patient left in no apparent distress:   Supine in bed, Call bell within reach, Bed / chair alarm activated, and Side rails x 3    COMMUNICATION/COLLABORATION:   The patients plan of care was discussed with: Registered nurse.      Ion Garber PT   Time Calculation: 24 mins

## 2021-01-18 NOTE — PROGRESS NOTES
1911: BP was 95/56  0047: BP was 94/58   0115: RN perfect serve Dr. Dominik Irene  With recommendation for fluid to be administer. 0145: MD ordered 0.9% sodium chloride at 125 ml/hr. 8208: Fluid  Administered  0324: BP is 97/57. Will continue to monitor.

## 2021-01-19 LAB
HEALTH STATUS, XMCV2T: NORMAL
SARS-COV-2, COV2: NOT DETECTED
SOURCE, COVRS: NORMAL
SPECIMEN SOURCE, FCOV2M: NORMAL
SPECIMEN TYPE, XMCV1T: NORMAL

## 2021-01-19 PROCEDURE — 74011250636 HC RX REV CODE- 250/636: Performed by: FAMILY MEDICINE

## 2021-01-19 PROCEDURE — 65270000029 HC RM PRIVATE

## 2021-01-19 PROCEDURE — 77030038269 HC DRN EXT URIN PURWCK BARD -A

## 2021-01-19 PROCEDURE — 74011250637 HC RX REV CODE- 250/637: Performed by: FAMILY MEDICINE

## 2021-01-19 PROCEDURE — 97116 GAIT TRAINING THERAPY: CPT

## 2021-01-19 RX ADMIN — SODIUM CHLORIDE 125 ML/HR: 9 INJECTION, SOLUTION INTRAVENOUS at 23:52

## 2021-01-19 RX ADMIN — SODIUM CHLORIDE 125 ML/HR: 9 INJECTION, SOLUTION INTRAVENOUS at 15:23

## 2021-01-19 RX ADMIN — ESCITALOPRAM OXALATE 10 MG: 10 TABLET ORAL at 08:40

## 2021-01-19 RX ADMIN — Medication 10 ML: at 15:21

## 2021-01-19 RX ADMIN — ALLOPURINOL 100 MG: 100 TABLET ORAL at 08:40

## 2021-01-19 RX ADMIN — SODIUM CHLORIDE 125 ML/HR: 9 INJECTION, SOLUTION INTRAVENOUS at 03:40

## 2021-01-19 RX ADMIN — Medication 1 CAPSULE: at 08:40

## 2021-01-19 RX ADMIN — Medication 0.4 MG: at 08:40

## 2021-01-19 RX ADMIN — CHOLECALCIFEROL TAB 125 MCG (5000 UNIT) 5000 UNITS: 125 TAB at 08:40

## 2021-01-19 RX ADMIN — Medication 10 ML: at 06:56

## 2021-01-19 RX ADMIN — ACETAMINOPHEN 650 MG: 325 TABLET ORAL at 23:53

## 2021-01-19 RX ADMIN — ATORVASTATIN CALCIUM 20 MG: 20 TABLET, FILM COATED ORAL at 08:40

## 2021-01-19 RX ADMIN — Medication 10 ML: at 23:53

## 2021-01-19 RX ADMIN — Medication 1 TABLET: at 08:40

## 2021-01-19 RX ADMIN — LISINOPRIL 20 MG: 20 TABLET ORAL at 08:40

## 2021-01-19 RX ADMIN — Medication 10 ML: at 06:00

## 2021-01-19 RX ADMIN — Medication 20 ML: at 06:55

## 2021-01-19 RX ADMIN — BUPROPION HYDROCHLORIDE 150 MG: 150 TABLET, EXTENDED RELEASE ORAL at 07:12

## 2021-01-19 NOTE — PROGRESS NOTES
Bedside shift change report given to Maye Rosario RN (oncoming nurse) by Watson Aldana RN (offgoing nurse). Report included the following information SBAR, MAR, Accordion and Recent Results.

## 2021-01-19 NOTE — PROGRESS NOTES
Transition of Care Plan:   RUR-10%  1. Pt's dtr chose [facility] and [facility] for snf placement; both snf's are pending review  2. Will need 2 covid test (#1 results pending) and CXR-sent  3. PT/OT following  4. CM following for any needs  FANTASMA Miller

## 2021-01-19 NOTE — PROGRESS NOTES
RN spoke with Love HOBSON and was informed about replacing cervical collar with soft cervical collar.

## 2021-01-19 NOTE — PROGRESS NOTES
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 No spinal cord abnormalities. Pain management. Fall precaution. evaluated by orthopedics and recommended outpatient FU with Dr. Kelsy Nuñez in 10-14. May need to repeat imaging at that time.       HTN (hypertension) (2021). Continue lisinopril      Hyperlipidemia (2021). On statin       Anxiety and depression (2021)/ Dementia ([address] (2021). Continue Wellbutrin and Lexapro. Need to be evaluated by neuropsychologist as an outpatient to evaluate cognition.     CODE STATUS: Full          Subjective:     Chief Complaint[de-identified] f/u falls  ROS:  (bold if positive, if negative)    Tolerating PT  Tolerating Diet        Objective:     Last 24hrs VS reviewed since prior progress note.  Most recent are:    Visit Vitals  /65 (BP 1 Location: Left arm, BP Patient Position: Sitting)   Pulse 77   Temp 97.8 °F (36.6 °C)   Resp 16   Wt 55.8 kg (123 lb)   SpO2 96%     SpO2 Readings from Last 6 Encounters:   21 96%            Intake/Output Summary (Last 24 hours) at 2021 1621  Last data filed at 2021 1316  Gross per 24 hour   Intake 480 ml   Output 1600 ml   Net -1120 ml        Physical Exam:    Gen:  Well-developed, well-nourished, in no acute distress  HEENT:  Pink conjunctivae, PERRL, hearing intact to voice, moist mucous membranes  Neck:  Supple, without masses, thyroid non-tender  Resp:  No accessory muscle use, clear breath sounds without wheezes rales or rhonchi  Card:  No murmurs, normal S1, S2 without thrills, bruits or peripheral edema  Abd:  Soft, non-tender, non-distended, normoactive bowel sounds are present, no palpable organomegaly and no detectable hernias  Lymph:  No cervical or inguinal adenopathy  Musc:  No cyanosis or clubbing  Skin:  No rashes or ulcers, skin turgor is good  Neuro:  Cranial nerves are grossly intact, no focal motor weakness, follows commands appropriately  Psych:  poor insight,    __________________________________________________________________  Medications Reviewed: (see below)  Medications:     Current Facility-Administered Medications   Medication Dose Route Frequency    0.9% sodium chloride infusion  125 mL/hr IntraVENous CONTINUOUS    allopurinoL (ZYLOPRIM) tablet 100 mg  100 mg Oral DAILY    atorvastatin (LIPITOR) tablet 20 mg  20 mg Oral DAILY    buPROPion XL (WELLBUTRIN XL) tablet 150 mg  150 mg Oral 7am    escitalopram oxalate (LEXAPRO) tablet 10 mg  10 mg Oral DAILY    folic acid tablet 0.4 mg  0.4 mg Oral DAILY    lactobac ac& pc-s.therm-b.anim (KELLI Q/RISAQUAD)  1 Cap Oral DAILY    lisinopriL (PRINIVIL, ZESTRIL) tablet 20 mg  20 mg Oral DAILY    sodium chloride (NS) flush 5-40 mL  5-40 mL IntraVENous Q8H    sodium chloride (NS) flush 5-40 mL  5-40 mL IntraVENous PRN    acetaminophen (TYLENOL) tablet 650 mg  650 mg Oral Q6H PRN    Or    acetaminophen (TYLENOL) suppository 650 mg  650 mg Rectal Q6H PRN    polyethylene glycol (MIRALAX) packet 17 g  17 g Oral DAILY PRN    sodium chloride (NS) flush 5-40 mL  5-40 mL IntraVENous Q8H    sodium chloride (NS) flush 5-40 mL  5-40 mL IntraVENous PRN    acetaminophen (TYLENOL) tablet 650 mg  650 mg Oral Q6H PRN    Or    acetaminophen (TYLENOL) suppository 650 mg  650 mg Rectal Q6H PRN    polyethylene glycol (MIRALAX) packet 17 g  17 g Oral DAILY PRN    cholecalciferol (VITAMIN D3) tablet 5,000 Units  5,000 Units Oral DAILY    And    cholecalciferol (VITAMIN D3) (1000 Units /25 mcg) tablet 1 Tab  1,000 Units Oral DAILY        Lab Data Reviewed: (see below)  Lab Review:     Recent Labs     01/17/21 0248   WBC 12.3*   HGB 14.4   HCT 44.7        Recent Labs     01/17/21 0248   *   K 4.3      CO2 27   *   BUN 14   CREA 0.80   CA 9.3     No results found for: GLUCPOC  No results for input(s): PH, PCO2, PO2, HCO3, FIO2 in the last 72 hours. No results for input(s): INR, INREXT, INREXT in the last 72 hours. All Micro Results     None          I have reviewed notes of prior 24hr. Other pertinent lab: Total time spent with patient: 30 Minutes I personally reviewed chart, notes, data and current medications in the medical record. I have personally examined and treated the patient at bedside during this period.                  Care Plan discussed with: Patient, Family, Nursing Staff and >50% of time spent in counseling and coordination of care    Discussed:  Care Plan    Prophylaxis:  Lovenox    Disposition:  SNF/LTC           ___________________________________________________    Attending Physician: Wilmar Blanchard MD

## 2021-01-19 NOTE — PROGRESS NOTES
I discussed case again, with Dr. Cony Simmons after patient's daughter expressed concern that the hard collar was causing more pain for her mother. I explained the reason for using the hard collar and reason for immobilization. After discussing the risks and benefits of not immobilizing in a hard collar including further displacement of the fracture and paralysis the family wished to use a soft collar for symptomatic management of her pain. I placed orders for this. There is nothing further from a surgical perspective orthopedic spine surgery would recommend currently. If this fracture further displaces the daughter understands the treatment options including surgery. She will followup with Dr. Grey Ko in 10-14 days. Family also had concerns over her right hip pain. She has moderate right hip osteoarthritis on xrays. She had no pain with examination this weekend. If she continues to have worsening pain I would recommend a MRI of the right hip to evaluate for a femoral neck fracture and reconsult us as needed.          Gio Welsh PA-C  [facility]  [address] [facility]

## 2021-01-19 NOTE — PROGRESS NOTES
Problem: Mobility Impaired (Adult and Pediatric)  Goal: *Acute Goals and Plan of Care (Insert Text)  Description: FUNCTIONAL STATUS PRIOR TO ADMISSION: Patient was modified independent using a rolling walker for functional mobility. HOME SUPPORT PRIOR TO ADMISSION: The patient lived with daughter. Physical Therapy Goals  Initiated 1/17/2021  1. Patient will move from supine to sit and sit to supine  in bed with modified independence within 7 day(s). 2.  Patient will transfer from bed to chair and chair to bed with supervision/set-up using the least restrictive device within 7 day(s). 3.  Patient will perform sit to stand with supervision/set-up within 7 day(s). 4.  Patient will ambulate with supervision/set-up for 50 feet with the least restrictive device within 7 day(s). 5.  Patient will ascend/descend 14 stairs with bilateral handrail(s) with minimal assistance/contact guard assist within 7 day(s). Outcome: Progressing Towards Goal   PHYSICAL THERAPY TREATMENT  Patient: Alejandro Reyna (61 y.o. female)  Date: 1/19/2021  Diagnosis: Odontoid fracture with type II morphology and anterior displacement ([facility] [S12.110A]  Falls [W19. XXXA]  Closed odontoid fracture with type II morphology and anterior displacement, initial encounter ([facility] [S12.110A] Odontoid fracture with type II morphology and anterior displacement (HCC)       Precautions: Fall, Other (comment)(Aspen collar )  Chart, physical therapy assessment, plan of care and goals were reviewed. ASSESSMENT  Patient continues with skilled PT services and is progressing towards goals. Patient is up to chair with soft cervical collar on. Noted chart that MD ok soft collar vs [place] due to family request.  Noted no c/o with soft collar during activity today. Patient sit to stand mod A x 2. First few steps small shuffle and 2 knee buckle on left with patient stating she has pain in her back.   Patient with continued smaller moments of L knee buckling (limp) and will need careful guarding. Patient progressed her ambulation distance and able to ambulate 12' with RW mod A x 2 Patient will benefit from continued skilled PT and rehab at discharge. Current Level of Function Impacting Discharge (mobility/balance): mod A x 2    Other factors to consider for discharge:          PLAN :  Patient continues to benefit from skilled intervention to address the above impairments. Continue treatment per established plan of care. to address goals. Recommendation for discharge: (in order for the patient to meet his/her long term goals)  Therapy up to 5 days/week in SNF setting    This discharge recommendation:  Has not yet been discussed the attending provider and/or case management    IF patient discharges home will need the following DME: Aspen/soft cervical collar, RW       SUBJECTIVE:   Patient stated I am doing ok    OBJECTIVE DATA SUMMARY:   Critical Behavior:  Neurologic State: Alert, Eyes open to pain, Eyes open to stimulus, Eyes open spontaneously, Eyes open to voice  Orientation Level: Oriented to situation, Oriented to place, Disoriented to time, Oriented to person  Cognition: Follows commands, Poor safety awareness  Safety/Judgement: Decreased awareness of environment, Lack of insight into deficits, Decreased awareness of need for assistance, Decreased awareness of need for safety  Functional Mobility Training:  Bed Mobility:                    Transfers:  Sit to Stand: Assist x2; Moderate assistance  Stand to Sit: Moderate assistance;Assist x2                             Balance:  Sitting - Static: Fair (occasional)  Standing: Impaired; With support  Standing - Static: Constant support  Ambulation/Gait Training:  Distance (ft): 16 Feet (ft)  Assistive Device: Gait belt;Walker, rolling;Brace/Splint  Ambulation - Level of Assistance: Assist x2; Moderate assistance        Gait Abnormalities: Altered arm swing;Decreased step clearance; Path deviations; Step to gait(L knee buckle x 2)        Base of Support: Narrowed     Speed/Tatianna: Pace decreased (<100 feet/min)  Step Length: Left shortened;Right shortened                   Stairs:unable              Therapeutic Exercises: Ankle pumps x 10  Long arc quads x 10  Seated march x 10    Pain Ratin/10    Activity Tolerance:   Fair    After treatment patient left in no apparent distress:   Sitting in chair, Call bell within reach, and Bed / chair alarm activated    COMMUNICATION/COLLABORATION:   The patients plan of care was discussed with: Registered nurse.      Shannan Sinha DPT   Time Calculation: 12 mins

## 2021-01-19 NOTE — PROGRESS NOTES
Bedside and Verbal shift change report given to Kinga Schultz (oncoming nurse) by Alejandro Vanegas (offgoing nurse). Report included the following information SBAR, Kardex, Procedure Summary, Intake/Output, MAR, Accordion, Recent Results and Med Rec Status.

## 2021-01-20 LAB — SARS-COV-2, COV2: NORMAL

## 2021-01-20 PROCEDURE — U0005 INFEC AGEN DETEC AMPLI PROBE: HCPCS

## 2021-01-20 PROCEDURE — 74011250637 HC RX REV CODE- 250/637: Performed by: FAMILY MEDICINE

## 2021-01-20 PROCEDURE — 65270000029 HC RM PRIVATE

## 2021-01-20 RX ADMIN — Medication 10 ML: at 21:59

## 2021-01-20 RX ADMIN — Medication 10 ML: at 07:33

## 2021-01-20 RX ADMIN — Medication 1 CAPSULE: at 08:37

## 2021-01-20 RX ADMIN — LISINOPRIL 20 MG: 20 TABLET ORAL at 08:37

## 2021-01-20 RX ADMIN — Medication 10 ML: at 15:29

## 2021-01-20 RX ADMIN — ACETAMINOPHEN 650 MG: 325 TABLET ORAL at 21:58

## 2021-01-20 RX ADMIN — ESCITALOPRAM OXALATE 10 MG: 10 TABLET ORAL at 08:37

## 2021-01-20 RX ADMIN — Medication 0.4 MG: at 08:37

## 2021-01-20 RX ADMIN — ALLOPURINOL 100 MG: 100 TABLET ORAL at 08:37

## 2021-01-20 RX ADMIN — Medication 10 ML: at 15:28

## 2021-01-20 RX ADMIN — BUPROPION HYDROCHLORIDE 150 MG: 150 TABLET, EXTENDED RELEASE ORAL at 07:33

## 2021-01-20 RX ADMIN — ATORVASTATIN CALCIUM 20 MG: 20 TABLET, FILM COATED ORAL at 08:37

## 2021-01-20 RX ADMIN — CHOLECALCIFEROL TAB 125 MCG (5000 UNIT) 5000 UNITS: 125 TAB at 08:37

## 2021-01-20 RX ADMIN — Medication 1 TABLET: at 08:37

## 2021-01-20 NOTE — PROGRESS NOTES
Bedside and Verbal shift change report given to MARISABEL Krishnan (oncoming nurse) by Kemar Cruz RN (offgoing nurse). Report included the following information SBAR, Intake/Output and Recent Results.

## 2021-01-20 NOTE — PROGRESS NOTES
Bedside and Verbal shift change report given to  Anuradha Fernandez, [facility] (oncoming nurse) by   Kemi Vargas RN (offgoing nurse). Report included the following information SBAR, Kardex, Intake/Output, MAR, Accordion, Recent Results and Med Rec Status.

## 2021-01-20 NOTE — PROGRESS NOTES
2:45pm:  CM s/w patient's daughter, Gee Pham.  Provided update. Still awaiting official acceptance from [facility] and to hear back from [facility]. Marilynn López LCSW    10:50am: MAYE sent updated COVID test to [facility]. Patient will need a second test for placement at their facility. RN notified. MAYE placed a call to [facility] to f/u on referral.  Left  for admissions coordinator, Marta Rubio. Marilynn López LCSW      Transition of Care Plan- RUR 10%:    1. CM following  2. Referrals have already been sent to [facility] and [facility]. Both referrals are still pending.       Marilynn López LCSW

## 2021-01-20 NOTE — PROGRESS NOTES
Comprehensive Nutrition Assessment    Type and Reason for Visit: Initial, RD nutrition re-screen/LOS    Nutrition Recommendations/Plan:   1. Continue regular diet. 2. Added Ensure Enlive BID to promote adequate intake. 3. Please obtain new measured weight. Nutrition Assessment:      1/20: 79 y/o female admitted with fall, odontoid fx. PMH includes HTN. Pt answering questions appropriately at time of visit. No family in room. Reports good appetite, feels she was eating normally PTA. Breakfast tray in room- 75% pancakes, 50% milk, 100% juice, 50% fruit, bites of grits consumed. Recorded intakes vary, 0-95% meals but appear mostly 25-50%. Pt thinks her weight has been stable. No weight hx in chart to confirm. No c/o N/V. Says she drinks Ensure about 3x/week at home. Agreeable to receiving while here for increased intake. Will add BID. Labs- Na 135. Meds- Vit. D3, folic acid, Aurora-Q. Intakes:  Patient Vitals for the past 168 hrs:   % Diet Eaten   01/19/21 2016 0 %   01/19/21 1316 50 %   01/19/21 0945 95 %   01/18/21 2245 50 %   01/18/21 1246 60 %   01/18/21 1000 45 %   01/17/21 1421 25 %   01/17/21 1100 25 %   01/17/21 0307 25 %   01/16/21 2225 25 %     Malnutrition Assessment:  Malnutrition Status: none    Estimated Daily Nutrient Needs:  Energy (kcal): 1236(951x 1.3 AF); Weight Used for Energy Requirements: Current  Protein (g): 56(1-1.2 g/kg);  Weight Used for Protein Requirements: Current  Fluid (ml/day): 1236; Method Used for Fluid Requirements: 1 ml/kcal      Nutrition Related Findings:  LBM unknown      Wounds:    None       Current Nutrition Therapies:  DIET REGULAR  DIET NUTRITIONAL SUPPLEMENTS Lunch, Breakfast; Ensure Enlive    Anthropometric Measures:  · Height:  5' 2\" (157.5 cm)  · Current Body Wt:  55.8 kg (123 lb)   · Admission Body Wt:       · Usual Body Wt:        · Ideal Body Wt:  110 lbs:  111.8 %   · Adjusted Body Weight:   ; Weight Adjustment for: No adjustment   · Adjusted BMI: · BMI Category:  Normal weight (BMI 22.0-24.9) age over 72       Nutrition Diagnosis:   · Inadequate oral intake related to inadequate protein-energy intake as evidenced by intake 0-25%, intake 26-50%(odontoid fx)    Nutrition Interventions:   Food and/or Nutrient Delivery: Continue current diet, Start oral nutrition supplement  Nutrition Education and Counseling: No recommendations at this time  Coordination of Nutrition Care: Continue to monitor while inpatient    Goals:  PO intake >75% meals + ONS next 5-7 days       Nutrition Monitoring and Evaluation:   Behavioral-Environmental Outcomes: None identified  Food/Nutrient Intake Outcomes: Food and nutrient intake, Supplement intake  Physical Signs/Symptoms Outcomes: Weight, Biochemical data    Discharge Planning:    Continue current diet, Continue oral nutrition supplement     Electronically signed by Randy Alejandro RDN on 1/20/2021 at 10:54 AM    Contact: [phone]

## 2021-01-20 NOTE — PROGRESS NOTES
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 No spinal cord abnormalities. Pain management. Fall precaution. evaluated by orthopedics and recommended outpatient FU with Dr. Jaguar Muro in 10-14. May need to repeat imaging at that time.       HTN (hypertension) (2021). Continue lisinopril      Hyperlipidemia (2021). On statin       Anxiety and depression (2021)/ Dementia ([address] (2021). Continue Wellbutrin and Lexapro. Need to be evaluated by neuropsychologist as an outpatient to evaluate cognition.     CODE STATUS: Full          Subjective:     Chief Complaint[de-identified] f/u falls  ROS:  (bold if positive, if negative)    Tolerating PT  Tolerating Diet        Objective:     Last 24hrs VS reviewed since prior progress note.  Most recent are:    Visit Vitals  /74 (BP 1 Location: Right arm, BP Patient Position: At rest)   Pulse 70   Temp 98 °F (36.7 °C)   Resp 16   Ht 5' 2\" (1.575 m)   Wt 55.8 kg (123 lb)   SpO2 97%   BMI 22.50 kg/m²     SpO2 Readings from Last 6 Encounters:   21 97%            Intake/Output Summary (Last 24 hours) at 2021 1604  Last data filed at 2021 1519  Gross per 24 hour   Intake 1472.92 ml   Output 1950 ml   Net -477.08 ml        Physical Exam:    Gen:  Well-developed, well-nourished, in no acute distress  HEENT:  Pink conjunctivae, PERRL, hearing intact to voice, moist mucous membranes  Neck:  Supple, without masses, thyroid non-tender  Resp:  No accessory muscle use, clear breath sounds without wheezes rales or rhonchi  Card:  No murmurs, normal S1, S2 without thrills, bruits or peripheral edema  Abd:  Soft, non-tender, non-distended, normoactive bowel sounds are present, no palpable organomegaly and no detectable hernias  Lymph:  No cervical or inguinal adenopathy  Musc:  No cyanosis or clubbing  Skin:  No rashes or ulcers, skin turgor is good  Neuro:  Cranial nerves are grossly intact, no focal motor weakness, follows commands appropriately  Psych:  poor insight,    __________________________________________________________________  Medications Reviewed: (see below)  Medications:     Current Facility-Administered Medications   Medication Dose Route Frequency    0.9% sodium chloride infusion  125 mL/hr IntraVENous CONTINUOUS    allopurinoL (ZYLOPRIM) tablet 100 mg  100 mg Oral DAILY    atorvastatin (LIPITOR) tablet 20 mg  20 mg Oral DAILY    buPROPion XL (WELLBUTRIN XL) tablet 150 mg  150 mg Oral 7am    escitalopram oxalate (LEXAPRO) tablet 10 mg  10 mg Oral DAILY    folic acid tablet 0.4 mg  0.4 mg Oral DAILY    lactobac ac& pc-s.therm-b.anim (KELLI Q/RISAQUAD)  1 Cap Oral DAILY    lisinopriL (PRINIVIL, ZESTRIL) tablet 20 mg  20 mg Oral DAILY    sodium chloride (NS) flush 5-40 mL  5-40 mL IntraVENous Q8H    sodium chloride (NS) flush 5-40 mL  5-40 mL IntraVENous PRN    polyethylene glycol (MIRALAX) packet 17 g  17 g Oral DAILY PRN    sodium chloride (NS) flush 5-40 mL  5-40 mL IntraVENous Q8H    sodium chloride (NS) flush 5-40 mL  5-40 mL IntraVENous PRN    acetaminophen (TYLENOL) tablet 650 mg  650 mg Oral Q6H PRN    Or    acetaminophen (TYLENOL) suppository 650 mg  650 mg Rectal Q6H PRN    cholecalciferol (VITAMIN D3) tablet 5,000 Units  5,000 Units Oral DAILY    And    cholecalciferol (VITAMIN D3) (1000 Units /25 mcg) tablet 1 Tab  1,000 Units Oral DAILY        Lab Data Reviewed: (see below)  Lab Review:     No results for input(s): WBC, HGB, HCT, PLT, HGBEXT, HCTEXT, PLTEXT, HGBEXT, HCTEXT, PLTEXT in the last 72 hours.   No results for input(s): NA, K, CL, CO2, GLU, BUN, CREA, CA, MG, PHOS, ALB, TBIL, TBILI, ALT, INR, INREXT, INREXT in the last 72 hours. No lab exists for component: SGOT  No results found for: GLUCPOC  No results for input(s): PH, PCO2, PO2, HCO3, FIO2 in the last 72 hours. No results for input(s): INR, INREXT, INREXT in the last 72 hours. All Micro Results     Procedure Component Value Units Date/Time    SARS-COV-2, PCR [[id number]] Collected: 01/20/21 1247    Order Status: Completed Updated: 01/20/21 1349          I have reviewed notes of prior 24hr. Other pertinent lab: Total time spent with patient: 30 Minutes I personally reviewed chart, notes, data and current medications in the medical record. I have personally examined and treated the patient at bedside during this period.                  Care Plan discussed with: Patient, Family, Nursing Staff and >50% of time spent in counseling and coordination of care    Discussed:  Care Plan    Prophylaxis:  Lovenox    Disposition:  SNF/LTC           ___________________________________________________    Attending Physician: Wilmar Blanchard MD

## 2021-01-21 VITALS
SYSTOLIC BLOOD PRESSURE: 120 MMHG | RESPIRATION RATE: 17 BRPM | DIASTOLIC BLOOD PRESSURE: 72 MMHG | WEIGHT: 123 LBS | HEIGHT: 62 IN | TEMPERATURE: 98.1 F | HEART RATE: 79 BPM | BODY MASS INDEX: 22.63 KG/M2 | OXYGEN SATURATION: 97 %

## 2021-01-21 LAB
SARS-COV-2, XPLCVT: NOT DETECTED
SOURCE, COVRS: NORMAL

## 2021-01-21 PROCEDURE — 74011250637 HC RX REV CODE- 250/637: Performed by: FAMILY MEDICINE

## 2021-01-21 PROCEDURE — 74011250637 HC RX REV CODE- 250/637: Performed by: INTERNAL MEDICINE

## 2021-01-21 PROCEDURE — 97530 THERAPEUTIC ACTIVITIES: CPT

## 2021-01-21 PROCEDURE — 94760 N-INVAS EAR/PLS OXIMETRY 1: CPT

## 2021-01-21 PROCEDURE — 77030038269 HC DRN EXT URIN PURWCK BARD -A

## 2021-01-21 RX ADMIN — BUPROPION HYDROCHLORIDE 150 MG: 150 TABLET, EXTENDED RELEASE ORAL at 07:04

## 2021-01-21 RX ADMIN — ALLOPURINOL 100 MG: 100 TABLET ORAL at 08:46

## 2021-01-21 RX ADMIN — Medication 0.4 MG: at 08:46

## 2021-01-21 RX ADMIN — Medication 1 CAPSULE: at 08:46

## 2021-01-21 RX ADMIN — Medication 10 ML: at 05:37

## 2021-01-21 RX ADMIN — CHOLECALCIFEROL TAB 125 MCG (5000 UNIT) 5000 UNITS: 125 TAB at 08:46

## 2021-01-21 RX ADMIN — Medication 1 TABLET: at 08:46

## 2021-01-21 RX ADMIN — RIVAROXABAN 15 MG: 15 TABLET, FILM COATED ORAL at 08:46

## 2021-01-21 RX ADMIN — LISINOPRIL 20 MG: 20 TABLET ORAL at 08:46

## 2021-01-21 RX ADMIN — ATORVASTATIN CALCIUM 20 MG: 20 TABLET, FILM COATED ORAL at 08:46

## 2021-01-21 RX ADMIN — ESCITALOPRAM OXALATE 10 MG: 10 TABLET ORAL at 08:46

## 2021-01-21 RX ADMIN — Medication 10 ML: at 08:50

## 2021-01-21 NOTE — PROGRESS NOTES
Bedside and Verbal shift change report given to MARISABEL Pacheco (oncoming nurse) by Aster Miner (offgoing nurse). Report included the following information SBAR, Kardex, Intake/Output and Recent Results.

## 2021-01-21 NOTE — PROGRESS NOTES
AVS reviewed with pt, will be given to daughter at pickup d/t pt periodic confusion. No PIV access, pt dressed for d/c, belongings gathered at bedside. Pt to be transported to ED entrance with [id number] packet when daughter arrives.

## 2021-01-21 NOTE — PROGRESS NOTES
Bedside and Verbal shift change report given to  Fina Jones RN (oncoming nurse) by  Vlad Vilchis (offgoing nurse). Report included the following information SBAR, Kardex, Intake/Output, MAR, Accordion, Recent Results and Med Rec Status.

## 2021-01-21 NOTE — PROGRESS NOTES
Problem: Mobility Impaired (Adult and Pediatric)  Goal: *Acute Goals and Plan of Care (Insert Text)  Description: FUNCTIONAL STATUS PRIOR TO ADMISSION: Patient was modified independent using a rolling walker for functional mobility. HOME SUPPORT PRIOR TO ADMISSION: The patient lived with daughter. Physical Therapy Goals  Initiated 1/17/2021  1. Patient will move from supine to sit and sit to supine  in bed with modified independence within 7 day(s). 2.  Patient will transfer from bed to chair and chair to bed with supervision/set-up using the least restrictive device within 7 day(s). 3.  Patient will perform sit to stand with supervision/set-up within 7 day(s). 4.  Patient will ambulate with supervision/set-up for 50 feet with the least restrictive device within 7 day(s). 5.  Patient will ascend/descend 14 stairs with bilateral handrail(s) with minimal assistance/contact guard assist within 7 day(s). Note:   PHYSICAL THERAPY TREATMENT  Patient: Maki Rodriguez (6 y.o. female)  Date: 1/21/2021  Diagnosis: Odontoid fracture with type II morphology and anterior displacement ([address] [S12.110A]  Falls [W19. XXXA]  Closed odontoid fracture with type II morphology and anterior displacement, initial encounter ([address] [S12.110A] Odontoid fracture with type II morphology and anterior displacement (HCC)       Precautions: Fall, Other (comment)(Aspen collar )  Chart, physical therapy assessment, plan of care and goals were reviewed. ASSESSMENT  Patient continues with skilled PT services. Pt comes to stand with min assist of 2. Pt stood with RW min assist of 2. Pt was assisted pulling up pants stand mod assist.Pt took 3 steps to [facility] with RW min assist of 2. Pt sit to supine mod assist of 2. Pt is now being discharged. Nursing finished assisting pt. Current Level of Function Impacting Discharge (mobility/balance): Pt min assist of 2 for bed mobility.              PLAN :  Patient continues to benefit from skilled intervention to address the above impairments. Continue treatment per established plan of care. to address goals. Recommendation for discharge: (in order for the patient to meet his/her long term goals)  Therapy up to 5 days/week in SNF setting    This discharge recommendation:  Has been made in collaboration with the attending provider and/or case management    IF patient discharges home will need the following DME: rolling walker       SUBJECTIVE:       OBJECTIVE DATA SUMMARY:   Critical Behavior:  Neurologic State: Alert, Confused  Orientation Level: Oriented to place, Disoriented to place, Disoriented to situation, Disoriented to time  Cognition: Follows commands, Poor safety awareness  Safety/Judgement: Decreased awareness of environment, Lack of insight into deficits, Decreased awareness of need for assistance, Decreased awareness of need for safety  Functional Mobility Training:  Bed Mobility:   Pt mod assist of 2 for bed mobility.                  Transfers:  Sit to Stand: Minimum assistance;Assist x2  Stand to Sit: Minimum assistance                             Balance:  Sitting - Static: Fair (occasional)  Standing: With support  Standing - Static: Fair      Activity Tolerance:   Fair    After treatment patient left in no apparent distress:   Supine in bed    COMMUNICATION/COLLABORATION:   The patients plan of care was discussed with: Physical therapist.     Aki Russo PTA   Time Calculation: 23 mins

## 2021-01-21 NOTE — PROGRESS NOTES
1:37 PM   01/21/21     RUR 10%     Transition Care Plan     1. CM following for discharge needs   2. Patient will be transported to [facility] today at 3:30 PM by her Daughter. Please have patient dressed daughter will call when she is downstairs. Please call report to [phone].  3. Discharge Packet is on chart that goes with patient.     Anrold Brooks RN CCM

## 2021-01-21 NOTE — DISCHARGE INSTRUCTIONS
HOSPITALIST DISCHARGE INSTRUCTIONS  NAME: Roseann Pak   :  1934   MRN:  [id number]     Date/Time:  2021 2:54 PM    ADMIT DATE: 1/15/2021     DISCHARGE DATE: 2021     ADMITTING DIAGNOSIS:  Odontoid fracture    DISCHARGE DIAGNOSIS:  As above    MEDICATIONS:    · It is important that you take the medication exactly as they are prescribed. · Keep your medication in the bottles provided by the pharmacist and keep a list of the medication names, dosages, and times to be taken in your wallet. · Do not take other medications without consulting your doctor. Pain Management: per above medications         Recommended diet:  Resume previous diet    Recommended activity: Activity as tolerated    If you experience any of the following symptoms then please call your primary care physician or return to the emergency room if you cannot get hold of your doctor:  Fever, chills, nausea, vomiting, diarrhea, change in mentation, falling, bleeding, shortness of breath        Information obtained by :  I understand that if any problems occur once I am at home I am to contact my physician. I understand and acknowledge receipt of the instructions indicated above.                                                                                                                                            Physician's or R.N.'s Signature                                                                  Date/Time                                                                                                                                              Patient or Representative Signature                                                          Date/Time

## 2021-01-21 NOTE — DISCHARGE SUMMARY
Physician Discharge Summary     Patient ID:  Dalton Mancia  [id number]  50 y.o.  1934    Admit date: 1/15/2021    Discharge date and time: 1/21/2021    Admission Diagnoses: Odontoid fracture with type II morphology and anterior displacement ([address] [S12.110A]  Falls [W19. XXXA]  Closed odontoid fracture with type II morphology and anterior displacement, initial encounter [facility]) [S12.110A]    Discharge Diagnoses:    Principal Problem:    Odontoid fracture with type II morphology and anterior displacement ([address] (1/16/2021)    Active Problems:    Falls (1/16/2021)      Closed anterior displaced type II dens fracture ([address] (1/16/2021)      HTN (hypertension) (1/16/2021)      Hyperlipidemia (1/16/2021)      Anxiety and depression (1/16/2021)      Dementia ([address] (1/16/2021)           Hospital Course:    Odontoid fracture with type II morphology and anterior displacement / Falls: MRI of C spine: Angulated type II odontoid fracture with 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 No spinal cord abnormalities. Pain management.  Fall precaution. evaluated by orthopedics and recommended outpatient FU with Dr. Zara Connelly in 10-14. May need to repeat imaging at that time.       HTN (hypertension) (1/16/2021).   Continue lisinopril      Hyperlipidemia (1/16/2021).   On statin       Anxiety and depression (1/16/2021)/ Dementia ([address] (1/16/2021).   Continue Wellbutrin and Lexapro.  Need to be evaluated by neuropsychologist as an outpatient to evaluate cognition. PCP: Other, Phys, MD     Consults: Orthopedic Surgery    Condition of patient at discharge: improved    Discharge Exam:  Please refer to progress note from today. Disposition: home    Patient Instructions:   Current Discharge Medication List      CONTINUE these medications which have NOT CHANGED    Details   !! allopurinoL (ZYLOPRIM) 100 mg tablet Take 100 mg by mouth daily. lisinopriL (PRINIVIL, ZESTRIL) 20 mg tablet Take 20 mg by mouth daily. cholecalciferol (VITAMIN D3) (2,000 UNITS /50 MCG) cap capsule Take 2,000 Units by mouth daily. Take 3 [id number]      FOLIC ACID PO Take 377 mcg by mouth daily. cyanocobalamin (Vitamin B-12) 1,000 mcg tablet Take 1,000 mcg by mouth daily. rivaroxaban (Xarelto) 15 mg tab tablet Take 15 mg by mouth daily. buPROPion XL (WELLBUTRIN XL) 150 mg tablet Take 150 mg by mouth every morning. cranberry fruit extract (CRANBERRY PO) Take 4,200 mg by mouth daily. Takes 2 [id number]      !! ALLOPURINOL PO Take 10 mg by mouth daily. escitalopram oxalate (LEXAPRO) 10 mg tablet Take 10 mg by mouth daily. atorvastatin (LIPITOR) 20 mg tablet Take 20 mg by mouth daily. aspirin delayed-release 81 mg tablet Take 81 mg by mouth daily. Lactobac no.41/Bifidobact no.7 (PROBIOTIC-10 PO) Take 1 Tab by mouth daily. cranberry fruit extract (CRANBERRY EXTRACT PO) Take 4,200 mg by mouth daily. Takes 2 pills       !! - Potential duplicate medications found. Please discuss with provider. Activity: Activity as tolerated  Diet: Resume previous diet  Wound Care: None needed    Follow-up with Nanda Horne MD in 1 week.   Follow-up tests/labs none    Approximate time spent in patient care on day of discharge: 33 minutes    Signed:  Isaac Sy MD  1/21/2021  2:56 PM

## 2022-03-18 PROBLEM — F32.A ANXIETY AND DEPRESSION: Status: ACTIVE | Noted: 2021-01-16

## 2022-03-18 PROBLEM — F41.9 ANXIETY AND DEPRESSION: Status: ACTIVE | Noted: 2021-01-16

## 2022-03-19 PROBLEM — S12.110A: Status: ACTIVE | Noted: 2021-01-16

## 2022-03-19 PROBLEM — F03.90 DEMENTIA (HCC): Status: ACTIVE | Noted: 2021-01-16

## 2022-03-19 PROBLEM — I10 HTN (HYPERTENSION): Status: ACTIVE | Noted: 2021-01-16

## 2022-03-19 PROBLEM — E78.5 HYPERLIPIDEMIA: Status: ACTIVE | Noted: 2021-01-16

## 2022-03-20 PROBLEM — S12.110A CLOSED ANTERIOR DISPLACED TYPE II DENS FRACTURE (HCC): Status: ACTIVE | Noted: 2021-01-16
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
## 2023-05-15 RX ORDER — BUPROPION HYDROCHLORIDE 150 MG/1
150 TABLET ORAL
COMMUNITY

## 2023-05-15 RX ORDER — ASPIRIN 81 MG/1
81 TABLET ORAL DAILY
COMMUNITY

## 2023-05-15 RX ORDER — ALLOPURINOL 100 MG/1
100 TABLET ORAL DAILY
COMMUNITY

## 2023-05-15 RX ORDER — ESCITALOPRAM OXALATE 10 MG/1
10 TABLET ORAL DAILY
COMMUNITY

## 2023-05-15 RX ORDER — LISINOPRIL 20 MG/1
20 TABLET ORAL DAILY
COMMUNITY

## 2023-05-15 RX ORDER — ATORVASTATIN CALCIUM 20 MG/1
20 TABLET, FILM COATED ORAL DAILY
COMMUNITY